# Patient Record
Sex: FEMALE | Race: WHITE | Employment: FULL TIME | URBAN - METROPOLITAN AREA
[De-identification: names, ages, dates, MRNs, and addresses within clinical notes are randomized per-mention and may not be internally consistent; named-entity substitution may affect disease eponyms.]

---

## 2017-04-06 ENCOUNTER — EMPLOYEE WELLNESS (OUTPATIENT)
Dept: OTHER | Age: 57
End: 2017-04-06

## 2017-04-06 LAB
CHOLESTEROL, TOTAL: 204 MG/DL (ref 0–199)
GLUCOSE BLD-MCNC: 94 MG/DL (ref 70–99)
HDLC SERPL-MCNC: 72 MG/DL (ref 40–60)
LDL CHOLESTEROL CALCULATED: 119 MG/DL
TRIGL SERPL-MCNC: 67 MG/DL (ref 0–150)

## 2017-04-27 ENCOUNTER — OFFICE VISIT (OUTPATIENT)
Dept: INTERNAL MEDICINE CLINIC | Age: 57
End: 2017-04-27

## 2017-04-27 VITALS
HEIGHT: 64 IN | HEART RATE: 80 BPM | DIASTOLIC BLOOD PRESSURE: 80 MMHG | BODY MASS INDEX: 27.83 KG/M2 | SYSTOLIC BLOOD PRESSURE: 134 MMHG | TEMPERATURE: 98.3 F | WEIGHT: 163 LBS

## 2017-04-27 DIAGNOSIS — J30.1 SEASONAL ALLERGIC RHINITIS DUE TO POLLEN: ICD-10-CM

## 2017-04-27 DIAGNOSIS — Z00.00 ANNUAL PHYSICAL EXAM: Primary | ICD-10-CM

## 2017-04-27 DIAGNOSIS — Z12.11 COLON CANCER SCREENING: ICD-10-CM

## 2017-04-27 PROCEDURE — 99396 PREV VISIT EST AGE 40-64: CPT | Performed by: INTERNAL MEDICINE

## 2017-04-27 ASSESSMENT — ENCOUNTER SYMPTOMS
EYES NEGATIVE: 1
RHINORRHEA: 1
GASTROINTESTINAL NEGATIVE: 1
COLOR CHANGE: 0
STRIDOR: 0
APNEA: 0
CHOKING: 0
SINUS PRESSURE: 0

## 2017-04-27 ASSESSMENT — PATIENT HEALTH QUESTIONNAIRE - PHQ9
SUM OF ALL RESPONSES TO PHQ9 QUESTIONS 1 & 2: 0
SUM OF ALL RESPONSES TO PHQ QUESTIONS 1-9: 0
1. LITTLE INTEREST OR PLEASURE IN DOING THINGS: 0
2. FEELING DOWN, DEPRESSED OR HOPELESS: 0

## 2017-06-28 ENCOUNTER — SURG/PROC ORDERS (OUTPATIENT)
Dept: ANESTHESIOLOGY | Age: 57
End: 2017-06-28

## 2017-06-28 RX ORDER — SODIUM CHLORIDE 0.9 % (FLUSH) 0.9 %
10 SYRINGE (ML) INJECTION EVERY 12 HOURS SCHEDULED
Status: CANCELLED | OUTPATIENT
Start: 2017-06-28

## 2017-06-28 RX ORDER — SODIUM CHLORIDE 9 MG/ML
INJECTION, SOLUTION INTRAVENOUS CONTINUOUS
Status: CANCELLED | OUTPATIENT
Start: 2017-06-28

## 2017-06-28 RX ORDER — SODIUM CHLORIDE 0.9 % (FLUSH) 0.9 %
10 SYRINGE (ML) INJECTION PRN
Status: CANCELLED | OUTPATIENT
Start: 2017-06-28

## 2017-06-29 ENCOUNTER — HOSPITAL ENCOUNTER (OUTPATIENT)
Dept: ENDOSCOPY | Age: 57
Discharge: HOME | End: 2017-06-29
Attending: INTERNAL MEDICINE | Admitting: INTERNAL MEDICINE

## 2017-06-29 VITALS
BODY MASS INDEX: 27.11 KG/M2 | OXYGEN SATURATION: 100 % | HEART RATE: 75 BPM | SYSTOLIC BLOOD PRESSURE: 132 MMHG | RESPIRATION RATE: 16 BRPM | TEMPERATURE: 97.3 F | DIASTOLIC BLOOD PRESSURE: 77 MMHG | WEIGHT: 162.7 LBS | HEIGHT: 65 IN

## 2017-06-29 LAB — PREGNANCY, URINE: NEGATIVE

## 2017-06-29 RX ORDER — SODIUM CHLORIDE 0.9 % (FLUSH) 0.9 %
10 SYRINGE (ML) INJECTION PRN
Status: DISCONTINUED | OUTPATIENT
Start: 2017-06-29 | End: 2017-06-30 | Stop reason: HOSPADM

## 2017-06-29 RX ORDER — SODIUM CHLORIDE 9 MG/ML
INJECTION, SOLUTION INTRAVENOUS CONTINUOUS
Status: DISCONTINUED | OUTPATIENT
Start: 2017-06-29 | End: 2017-06-30 | Stop reason: HOSPADM

## 2017-06-29 RX ORDER — ONDANSETRON 2 MG/ML
4 INJECTION INTRAMUSCULAR; INTRAVENOUS
Status: ACTIVE | OUTPATIENT
Start: 2017-06-29 | End: 2017-06-29

## 2017-06-29 RX ORDER — SODIUM CHLORIDE 0.9 % (FLUSH) 0.9 %
10 SYRINGE (ML) INJECTION EVERY 12 HOURS SCHEDULED
Status: DISCONTINUED | OUTPATIENT
Start: 2017-06-29 | End: 2017-06-30 | Stop reason: HOSPADM

## 2017-06-29 RX ADMIN — SODIUM CHLORIDE: 9 INJECTION, SOLUTION INTRAVENOUS at 11:08

## 2017-06-29 ASSESSMENT — PAIN SCALES - GENERAL
PAINLEVEL_OUTOF10: 0
PAINLEVEL_OUTOF10: 0

## 2017-06-29 ASSESSMENT — ENCOUNTER SYMPTOMS: SHORTNESS OF BREATH: 0

## 2017-06-29 ASSESSMENT — PAIN - FUNCTIONAL ASSESSMENT: PAIN_FUNCTIONAL_ASSESSMENT: 0-10

## 2018-03-20 VITALS — BODY MASS INDEX: 28.05 KG/M2 | WEIGHT: 166 LBS

## 2018-03-29 ENCOUNTER — OFFICE VISIT (OUTPATIENT)
Dept: INTERNAL MEDICINE CLINIC | Age: 58
End: 2018-03-29

## 2018-03-29 VITALS
SYSTOLIC BLOOD PRESSURE: 132 MMHG | TEMPERATURE: 98.3 F | OXYGEN SATURATION: 98 % | BODY MASS INDEX: 27.46 KG/M2 | WEIGHT: 165 LBS | DIASTOLIC BLOOD PRESSURE: 86 MMHG | HEART RATE: 96 BPM

## 2018-03-29 DIAGNOSIS — J30.1 CHRONIC SEASONAL ALLERGIC RHINITIS DUE TO POLLEN: ICD-10-CM
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
## 2018-03-29 PROCEDURE — 99214 OFFICE O/P EST MOD 30 MIN: CPT | Performed by: INTERNAL MEDICINE

## 2018-03-29 RX ORDER — METHYLPREDNISOLONE 4 MG/1
TABLET ORAL
Qty: 1 KIT | Refills: 0 | Status: SHIPPED | OUTPATIENT
Start: 2018-03-29 | End: 2018-04-04

## 2018-03-29 RX ORDER — BETAMETHASONE DIPROPIONATE 0.5 MG/G
CREAM TOPICAL
Qty: 15 G | Refills: 1 | Status: SHIPPED | OUTPATIENT
Start: 2018-03-29 | End: 2018-04-28

## 2018-03-29 RX ORDER — DOXYCYCLINE HYCLATE 100 MG
100 TABLET ORAL 2 TIMES DAILY
Qty: 14 TABLET | Refills: 0 | Status: SHIPPED | OUTPATIENT
Start: 2018-03-29 | End: 2018-04-05

## 2018-03-29 ASSESSMENT — ENCOUNTER SYMPTOMS: COLOR CHANGE: 1

## 2018-03-29 NOTE — PATIENT INSTRUCTIONS
Please call your pharmacy if you need any refills of your medication(s). Please call our office at [phone] if you don't hear from us about your test results. Bring an accurate list of your medications with you at every appointment to ensure that we have the correct information.     Our office hours are: Monday - Friday 8:30 am- 5 pm    Phone lines turn on at 8:30 am

## 2018-05-14 ENCOUNTER — EMPLOYEE WELLNESS (OUTPATIENT)
Dept: OTHER | Age: 58
End: 2018-05-14

## 2018-05-14 LAB
CHOLESTEROL, TOTAL: 203 MG/DL (ref 0–199)
GLUCOSE BLD-MCNC: 109 MG/DL (ref 70–99)
HDLC SERPL-MCNC: 76 MG/DL (ref 40–60)
LDL CHOLESTEROL CALCULATED: 102 MG/DL
TRIGL SERPL-MCNC: 123 MG/DL (ref 0–150)

## 2018-05-21 VITALS — BODY MASS INDEX: 26.96 KG/M2 | WEIGHT: 162 LBS

## 2018-07-05 ENCOUNTER — OFFICE VISIT (OUTPATIENT)
Dept: INTERNAL MEDICINE CLINIC | Age: 58
End: 2018-07-05

## 2018-07-05 ENCOUNTER — TELEPHONE (OUTPATIENT)
Dept: INTERNAL MEDICINE CLINIC | Age: 58
End: 2018-07-05

## 2018-07-05 VITALS
WEIGHT: 161.38 LBS | HEART RATE: 80 BPM | SYSTOLIC BLOOD PRESSURE: 138 MMHG | TEMPERATURE: 98.6 F | DIASTOLIC BLOOD PRESSURE: 80 MMHG | OXYGEN SATURATION: 98 % | BODY MASS INDEX: 26.85 KG/M2

## 2018-07-05 DIAGNOSIS — H60.339 ACUTE SWIMMER'S EAR, UNSPECIFIED LATERALITY: Primary | ICD-10-CM

## 2018-07-05 PROCEDURE — 99213 OFFICE O/P EST LOW 20 MIN: CPT | Performed by: INTERNAL MEDICINE

## 2018-07-05 ASSESSMENT — ENCOUNTER SYMPTOMS
RHINORRHEA: 1
GASTROINTESTINAL NEGATIVE: 1

## 2018-07-05 ASSESSMENT — PATIENT HEALTH QUESTIONNAIRE - PHQ9
1. LITTLE INTEREST OR PLEASURE IN DOING THINGS: 0
2. FEELING DOWN, DEPRESSED OR HOPELESS: 0
SUM OF ALL RESPONSES TO PHQ QUESTIONS 1-9: 0
SUM OF ALL RESPONSES TO PHQ9 QUESTIONS 1 & 2: 0

## 2018-07-05 NOTE — PATIENT INSTRUCTIONS
Please call your pharmacy if you need any refills of your medication(s). Please call our office at [phone] if you don't hear from us about your test results. Bring an accurate list of your medications with you at every appointment to ensure that we have the correct information.     Our office hours are: Monday - Friday 8:30 am- 5 pm    Phone lines turn on at 8:30 am

## 2018-07-05 NOTE — PROGRESS NOTES
Subjective:      Patient ID: Andrei Singh is a 56 y.o. female. Patient presents with:  Otalgia: right ear pain off and on, swimming a lot, wasn't sure if tmj or ear inf. hurt when she put her hearing aid in. Andrei Singh is a 56 y.o. female with the [facility]: There are no active problems to display for this patient. Current Outpatient Prescriptions:  ciprofloxacin-hydrocortisone (CIPRO HC) 0.2-1 % otic suspension, Place 4 drops in ear(s) 2 times daily for 7 days, Disp: 10 mL, Rfl: 0  fluticasone (FLONASE) 50 MCG/ACT nasal spray, 1 spray by Nasal route as needed for Rhinitis, Disp: , Rfl:   MULTIPLE VITAMIN PO, Take  by mouth daily. , Disp: , Rfl:   guaiFENesin (MUCINEX) 600 MG SR tablet, Take 1,200 mg by mouth daily as needed. , Disp: , Rfl:     No current facility-administered medications for this visit. Allergies: Patient has no known allergies. Past Medical History:  No date: Allergic rhinitis  No date: Hearing loss in       Comment: since birth, wears hearing aids  No date: Hearing loss in right ear      Comment: wears hearing aids  No date: Wears hearing aid      Comment: bilateral  Past Surgical History:  2017: COLONOSCOPY  No date: EXTERNAL EAR SURGERY Left      Comment: at birth  No date: TONSILLECTOMY  Review of patient's family history indicates:  Problem: Heart Disease      Relation: Father       Age of Onset: (Not Specified)   Problem: Cancer      Relation: Father       Age of Onset: (Not Specified)       Comment: prostate cancer. Smoking status: Former Smoker                                                              Packs/day: 0.50      Years: 30.00        Quit date: 2012  Smokeless tobacco: Never Used                      Alcohol use: Yes           2.4 oz/week     Cans of beer: 4 per week     Comment: couple times per week        Otalgia    There is pain in the right ear. This is a new problem.  The current episode started in the past 7 visit:    Acute swimmer's ear, unspecified laterality  -     ciprofloxacin-hydrocortisone (CIPRO HC) 0.2-1 % otic suspension; Place 4 drops in ear(s) 2 times daily for 7 days

## 2018-11-26 ENCOUNTER — HOSPITAL ENCOUNTER (OUTPATIENT)
Dept: WOMENS IMAGING | Age: 58
Discharge: HOME | End: 2018-11-26
Payer: COMMERCIAL

## 2018-11-26 DIAGNOSIS — Z12.31 VISIT FOR SCREENING MAMMOGRAM: ICD-10-CM

## 2018-11-26 PROCEDURE — 77067 SCR MAMMO BI INCL CAD: CPT

## 2019-01-01 ENCOUNTER — APPOINTMENT (OUTPATIENT)
Dept: CT IMAGING | Age: 59
End: 2019-01-01
Payer: COMMERCIAL

## 2019-01-01 ENCOUNTER — HOSPITAL ENCOUNTER (EMERGENCY)
Age: 59
Discharge: HOME | End: 2019-01-01
Attending: EMERGENCY MEDICINE
Payer: COMMERCIAL

## 2019-01-01 VITALS
BODY MASS INDEX: 26.85 KG/M2 | HEIGHT: 65 IN | OXYGEN SATURATION: 100 % | HEART RATE: 86 BPM | DIASTOLIC BLOOD PRESSURE: 80 MMHG | RESPIRATION RATE: 24 BRPM | TEMPERATURE: 97 F | SYSTOLIC BLOOD PRESSURE: 167 MMHG

## 2019-01-01 DIAGNOSIS — R42 DIZZINESS: ICD-10-CM

## 2019-01-01 DIAGNOSIS — H81.11 BENIGN PAROXYSMAL POSITIONAL VERTIGO OF RIGHT EAR: Primary | ICD-10-CM

## 2019-01-01 LAB
A/G RATIO: 1.5 (ref 1.1–2.2)
ALBUMIN SERPL-MCNC: 4.9 G/DL (ref 3.4–5)
ALP BLD-CCNC: 77 U/L (ref 40–129)
ALT SERPL-CCNC: 20 U/L (ref 10–40)
ANION GAP SERPL CALCULATED.3IONS-SCNC: 18 MMOL/L (ref 3–16)
AST SERPL-CCNC: 18 U/L (ref 15–37)
BASOPHILS ABSOLUTE: 0 K/UL (ref 0–0.2)
BASOPHILS RELATIVE PERCENT: 0.6 %
BILIRUB SERPL-MCNC: 0.7 MG/DL (ref 0–1)
BUN BLDV-MCNC: 24 MG/DL (ref 7–20)
CALCIUM SERPL-MCNC: 9.4 MG/DL (ref 8.3–10.6)
CHLORIDE BLD-SCNC: 101 MMOL/L (ref 99–110)
CO2: 19 MMOL/L (ref 21–32)
CREAT SERPL-MCNC: 0.9 MG/DL (ref 0.6–1.1)
EOSINOPHILS ABSOLUTE: 0.2 K/UL (ref 0–0.6)
EOSINOPHILS RELATIVE PERCENT: 2 %
GFR AFRICAN AMERICAN: >60
GFR NON-AFRICAN AMERICAN: >60
GLOBULIN: 3.2 G/DL
GLUCOSE BLD-MCNC: 119 MG/DL (ref 70–99)
GLUCOSE BLD-MCNC: 120 MG/DL (ref 70–99)
HCT VFR BLD CALC: 52.8 % (ref 36–48)
HEMOGLOBIN: 17.5 G/DL (ref 12–16)
LYMPHOCYTES ABSOLUTE: 2.7 K/UL (ref 1–5.1)
LYMPHOCYTES RELATIVE PERCENT: 34.3 %
MCH RBC QN AUTO: 31.5 PG (ref 26–34)
MCHC RBC AUTO-ENTMCNC: 33.1 G/DL (ref 31–36)
MCV RBC AUTO: 95.1 FL (ref 80–100)
MONOCYTES ABSOLUTE: 0.5 K/UL (ref 0–1.3)
MONOCYTES RELATIVE PERCENT: 7 %
NEUTROPHILS ABSOLUTE: 4.4 K/UL (ref 1.7–7.7)
NEUTROPHILS RELATIVE PERCENT: 56.1 %
PDW BLD-RTO: 12.3 % (ref 12.4–15.4)
PERFORMED ON: ABNORMAL
PLATELET # BLD: 221 K/UL (ref 135–450)
PMV BLD AUTO: 7.8 FL (ref 5–10.5)
POTASSIUM REFLEX MAGNESIUM: 3.9 MMOL/L (ref 3.5–5.1)
RBC # BLD: 5.55 M/UL (ref 4–5.2)
SODIUM BLD-SCNC: 138 MMOL/L (ref 136–145)
TOTAL PROTEIN: 8.1 G/DL (ref 6.4–8.2)
TROPONIN: <0.01 NG/ML
WBC # BLD: 7.8 K/UL (ref 4–11)

## 2019-01-01 PROCEDURE — 99284 EMERGENCY DEPT VISIT MOD MDM: CPT

## 2019-01-01 PROCEDURE — 6360000004 HC RX CONTRAST MEDICATION: Performed by: EMERGENCY MEDICINE

## 2019-01-01 PROCEDURE — 70498 CT ANGIOGRAPHY NECK: CPT

## 2019-01-01 PROCEDURE — 2580000003 HC RX 258: Performed by: EMERGENCY MEDICINE

## 2019-01-01 PROCEDURE — 36415 COLL VENOUS BLD VENIPUNCTURE: CPT

## 2019-01-01 PROCEDURE — 96361 HYDRATE IV INFUSION ADD-ON: CPT

## 2019-01-01 PROCEDURE — 80053 COMPREHEN METABOLIC PANEL: CPT

## 2019-01-01 PROCEDURE — 70496 CT ANGIOGRAPHY HEAD: CPT

## 2019-01-01 PROCEDURE — 84484 ASSAY OF TROPONIN QUANT: CPT

## 2019-01-01 PROCEDURE — 85025 COMPLETE CBC W/AUTO DIFF WBC: CPT

## 2019-01-01 PROCEDURE — 96360 HYDRATION IV INFUSION INIT: CPT

## 2019-01-01 PROCEDURE — 70450 CT HEAD/BRAIN W/O DYE: CPT

## 2019-01-01 PROCEDURE — 93005 ELECTROCARDIOGRAM TRACING: CPT | Performed by: EMERGENCY MEDICINE

## 2019-01-01 RX ORDER — AMOXICILLIN AND CLAVULANATE POTASSIUM 875; 125 MG/1; MG/1
1 TABLET, FILM COATED ORAL 2 TIMES DAILY
Qty: 20 TABLET | Refills: 0 | Status: SHIPPED | OUTPATIENT
Start: 2019-01-01 | End: 2019-01-11

## 2019-01-01 RX ORDER — MECLIZINE HYDROCHLORIDE 25 MG/1
25 TABLET ORAL 3 TIMES DAILY PRN
Qty: 30 TABLET | Refills: 0 | Status: SHIPPED | OUTPATIENT
Start: 2019-01-01 | End: 2019-01-11

## 2019-01-01 RX ORDER — SODIUM CHLORIDE, SODIUM LACTATE, POTASSIUM CHLORIDE, AND CALCIUM CHLORIDE .6; .31; .03; .02 G/100ML; G/100ML; G/100ML; G/100ML
2000 INJECTION, SOLUTION INTRAVENOUS ONCE
Status: COMPLETED | OUTPATIENT
Start: 2019-01-01 | End: 2019-01-01

## 2019-01-01 RX ADMIN — SODIUM CHLORIDE, POTASSIUM CHLORIDE, SODIUM LACTATE AND CALCIUM CHLORIDE 2000 ML: 600; 310; 30; 20 INJECTION, SOLUTION INTRAVENOUS at 20:14

## 2019-01-01 RX ADMIN — IOPAMIDOL 75 ML: 755 INJECTION, SOLUTION INTRAVENOUS at 19:35

## 2019-01-01 ASSESSMENT — PAIN SCALES - GENERAL: PAINLEVEL_OUTOF10: 0

## 2019-01-02 LAB
EKG ATRIAL RATE: 90 BPM
EKG DIAGNOSIS: NORMAL
EKG P AXIS: 43 DEGREES
EKG P-R INTERVAL: 140 MS
EKG Q-T INTERVAL: 372 MS
EKG QRS DURATION: 68 MS
EKG QTC CALCULATION (BAZETT): 455 MS
EKG R AXIS: 25 DEGREES
EKG T AXIS: 17 DEGREES
EKG VENTRICULAR RATE: 90 BPM

## 2019-01-02 PROCEDURE — 93010 ELECTROCARDIOGRAM REPORT: CPT | Performed by: INTERNAL MEDICINE

## 2019-05-06 ENCOUNTER — EMPLOYEE WELLNESS (OUTPATIENT)
Dept: OTHER | Age: 59
End: 2019-05-06

## 2019-05-06 LAB
CHOLESTEROL, TOTAL: 202 MG/DL (ref 0–199)
GLUCOSE BLD-MCNC: 94 MG/DL (ref 70–99)
HDLC SERPL-MCNC: 69 MG/DL (ref 40–60)
LDL CHOLESTEROL CALCULATED: 108 MG/DL
TRIGL SERPL-MCNC: 126 MG/DL (ref 0–150)

## 2019-05-13 VITALS — BODY MASS INDEX: 26.13 KG/M2 | WEIGHT: 157 LBS

## 2020-08-13 ENCOUNTER — OFFICE VISIT (OUTPATIENT)
Dept: FAMILY MEDICINE CLINIC | Age: 60
End: 2020-08-13
Payer: COMMERCIAL

## 2020-08-13 VITALS
OXYGEN SATURATION: 98 % | BODY MASS INDEX: 27.79 KG/M2 | DIASTOLIC BLOOD PRESSURE: 88 MMHG | HEART RATE: 87 BPM | WEIGHT: 167 LBS | TEMPERATURE: 97.9 F | SYSTOLIC BLOOD PRESSURE: 138 MMHG

## 2020-08-13 PROCEDURE — 99214 OFFICE O/P EST MOD 30 MIN: CPT | Performed by: INTERNAL MEDICINE

## 2020-08-13 ASSESSMENT — ENCOUNTER SYMPTOMS
STRIDOR: 0
EYES NEGATIVE: 1
CHOKING: 0
BACK PAIN: 1
COLOR CHANGE: 0
APNEA: 0
SINUS PRESSURE: 0

## 2020-08-13 NOTE — PROGRESS NOTES
Subjective:      Patient ID: Zulema Merino is a 64 y.o. female. Patient presents with:  Back Pain: mid left side of  back, with a deep breath, like a pulled muscle. Joint Swelling: right ankle painful and swollen. twisted it last month () at a friend's house. slipped in water and twisted it    Zulema Merino is a 64 y.o. female with the following history as recorded in [facility]: There are no active problems to display for this patient. Current Outpatient Medications:  Cyanocobalamin (B-12 PO), Take by mouth, Disp: , Rfl:   fluticasone (FLONASE) 50 MCG/ACT nasal spray, 1 spray by Nasal route as needed for Rhinitis, Disp: , Rfl:   MULTIPLE VITAMIN PO, Take  by mouth daily. , Disp: , Rfl:   guaiFENesin (MUCINEX) 600 MG SR tablet, Take 1,200 mg by mouth daily as needed. , Disp: , Rfl:     No current facility-administered medications for this visit. Allergies: Patient has no known allergies. Past Medical History:  No date: Allergic rhinitis  No date: Hearing loss in       Comment:  since birth, wears hearing aids  No date: Hearing loss in right ear      Comment:  wears hearing aids  No date: Wears hearing aid      Comment:  bilateral  Past Surgical History:  2017: COLONOSCOPY  No date: EXTERNAL EAR SURGERY; Left      Comment:  at birth  No date: TONSILLECTOMY  Review of patient's family history indicates:  Problem: Heart Disease      Relation: Father          Age of Onset: (Not Specified)  Problem: Cancer      Relation: Father          Age of Onset: (Not Specified)          Comment: prostate cancer. Social History    Tobacco Use      Smoking status: Current Some Day Smoker        Packs/day: 0.50        Years: 30.00        Pack years: 13        Quit date: 2012        Years since quittin.5      Smokeless tobacco: Never Used    Alcohol use:  Yes      Alcohol/week: 4.0 standard drinks      Types: 4 Cans of beer per week      Comment: couple times per week      Back Pain   Associated symptoms include chest pain. Ankle Pain      Chest Pain    Associated symptoms include back pain. Pertinent negatives include no dizziness. Pertinent negatives for past medical history include no seizures. Review of Systems   Constitutional: Negative. HENT: Negative for ear pain, hearing loss and sinus pressure. Eyes: Negative. Respiratory: Negative for apnea, choking and stridor. Cardiovascular: Positive for chest pain. Genitourinary: Negative for hematuria. Musculoskeletal: Positive for back pain. Rt ankle pain and swelling. Skin: Negative for color change and pallor. Neurological: Negative for dizziness, tremors, seizures and syncope. Hematological: Does not bruise/bleed easily. Psychiatric/Behavioral: Negative for confusion, decreased concentration and dysphoric mood. Objective:   Physical Exam  Constitutional:       General: She is not in acute distress. Appearance: She is well-developed. She is not diaphoretic. HENT:      Head: Normocephalic and atraumatic. Right Ear: External ear normal.      Left Ear: External ear normal.      Nose: Nose normal.      Mouth/Throat:      Pharynx: No oropharyngeal exudate. Eyes:      General: No scleral icterus. Right eye: No discharge. Left eye: No discharge. Conjunctiva/sclera: Conjunctivae normal.      Pupils: Pupils are equal, round, and reactive to light. Neck:      Musculoskeletal: Normal range of motion and neck supple. Thyroid: No thyromegaly. Vascular: No JVD. Trachea: No tracheal deviation. Cardiovascular:      Rate and Rhythm: Normal rate and regular rhythm. Heart sounds: Normal heart sounds. No murmur. No friction rub. No gallop. Pulmonary:      Effort: Pulmonary effort is normal. No respiratory distress. Breath sounds: Normal breath sounds. No stridor. No wheezing or rales. Chest:      Chest wall: No tenderness.    Abdominal:      General: There is no distension. Palpations: Abdomen is soft. There is no mass. Tenderness: There is no abdominal tenderness. There is no guarding or rebound. Genitourinary:     Vagina: Normal. No vaginal discharge. Rectum: Guaiac result negative. Musculoskeletal: Normal range of motion. General: No tenderness. Back:         Feet:    Lymphadenopathy:      Cervical: No cervical adenopathy. Skin:     General: Skin is warm and dry. Coloration: Skin is not pale. Findings: No erythema or rash. Neurological:      Mental Status: She is alert and oriented to person, place, and time. Cranial Nerves: No cranial nerve deficit. Motor: No abnormal muscle tone. Coordination: Coordination normal.      Deep Tendon Reflexes: Reflexes are normal and symmetric. Reflexes normal.   Psychiatric:         Behavior: Behavior normal.         Thought Content: Thought content normal.         Judgment: Judgment normal.         Assessment:      Encounter Diagnoses   Name Primary?  Sprain of posterior talofibular ligament of right ankle, initial encounter Yes    Acute left-sided low back pain without sciatica     Tobacco abuse            Plan:      Jennie Goetz was seen today for back pain and joint swelling. Diagnoses and all orders for this visit:    Sprain of posterior talofibular ligament of right ankle, initial encounter    Acute left-sided low back pain without sciatica    Tobacco abuse      May need MRI ankle if pain is getting worse.         Luis Randle MD

## 2021-03-01 ENCOUNTER — OFFICE VISIT (OUTPATIENT)
Dept: FAMILY MEDICINE CLINIC | Age: 61
End: 2021-03-01
Payer: COMMERCIAL

## 2021-03-01 VITALS
TEMPERATURE: 97.7 F | SYSTOLIC BLOOD PRESSURE: 144 MMHG | BODY MASS INDEX: 27.29 KG/M2 | OXYGEN SATURATION: 98 % | WEIGHT: 164 LBS | DIASTOLIC BLOOD PRESSURE: 92 MMHG | HEART RATE: 108 BPM

## 2021-03-01 DIAGNOSIS — I10 HYPERTENSION, UNSPECIFIED TYPE: Primary | ICD-10-CM

## 2021-03-01 DIAGNOSIS — Z72.0 TOBACCO ABUSE: ICD-10-CM

## 2021-03-01 DIAGNOSIS — H60.501 ACUTE OTITIS EXTERNA OF RIGHT EAR, UNSPECIFIED TYPE: ICD-10-CM

## 2021-03-01 PROCEDURE — 99213 OFFICE O/P EST LOW 20 MIN: CPT | Performed by: INTERNAL MEDICINE

## 2021-03-01 RX ORDER — DOXYCYCLINE HYCLATE 100 MG/1
100 CAPSULE ORAL 2 TIMES DAILY
Qty: 14 CAPSULE | Refills: 0 | Status: SHIPPED | OUTPATIENT
Start: 2021-03-01 | End: 2021-03-08

## 2021-03-01 RX ORDER — METOPROLOL SUCCINATE 25 MG/1
25 TABLET, EXTENDED RELEASE ORAL DAILY
Qty: 30 TABLET | Refills: 3 | Status: SHIPPED | OUTPATIENT
Start: 2021-03-01 | End: 2021-03-31 | Stop reason: SDUPTHER

## 2021-03-01 ASSESSMENT — ENCOUNTER SYMPTOMS
EYES NEGATIVE: 1
APNEA: 0
COLOR CHANGE: 0
CHOKING: 0
STRIDOR: 0
RHINORRHEA: 0
SINUS PRESSURE: 0
BACK PAIN: 0
GASTROINTESTINAL NEGATIVE: 1
SHORTNESS OF BREATH: 1

## 2021-03-01 NOTE — PROGRESS NOTES
Subjective:      Patient ID: Xochilt Christy is a 64 y.o. female. Patient presents with:  Otalgia: ears feel \"full\", carly right side. trouble hearing, vertigo. also GYN told her that her BP is very high. Xochilt Christy is a 64 y.o. female with the following history as recorded in [facility]: There are no active problems to display for this patient. Current Outpatient Medications:    MILK THISTLE PO, Take by mouth, Disp: , Rfl:     ZINC PO, Take by mouth, Disp: , Rfl:     metoprolol succinate (TOPROL XL) 25 MG extended release tablet, Take 1 tablet by mouth daily, Disp: 30 tablet, Rfl: 3    doxycycline hyclate (VIBRAMYCIN) 100 MG capsule, Take 1 capsule by mouth 2 times daily for 7 days, Disp: 14 capsule, Rfl: 0    fluticasone (FLONASE) 50 MCG/ACT nasal spray, 1 spray by Nasal route as needed for Rhinitis, Disp: , Rfl:     MULTIPLE VITAMIN PO, Take  by mouth daily. , Disp: , Rfl:     guaiFENesin (MUCINEX) 600 MG SR tablet, Take 1,200 mg by mouth daily as needed. , Disp: , Rfl:     No current facility-administered medications for this visit. Allergies: Patient has no known allergies. Past Medical History:  No date: Allergic rhinitis  No date: Hearing loss in       Comment:  since birth, wears hearing aids  No date: Hearing loss in right ear      Comment:  wears hearing aids  No date: Wears hearing aid      Comment:  bilateral  Past Surgical History:  2017: COLONOSCOPY  No date: EXTERNAL EAR SURGERY; Left      Comment:  at birth  No date: TONSILLECTOMY  Review of patient's family history indicates:  Problem: Heart Disease      Relation: Father          Age of Onset: (Not Specified)  Problem: Cancer      Relation: Father          Age of Onset: (Not Specified)          Comment: prostate cancer.     Social History    Tobacco Use      Smoking status: Former Smoker        Packs/day: 0.50        Years: 30.00        Pack years: 13        Quit date: 2020        Years since quittin.5 Smokeless tobacco: Never Used    Alcohol use: Yes      Alcohol/week: 4.0 standard drinks      Types: 4 Cans of beer per week      Comment: couple times per week      Hypertension  This is a new problem. The current episode started 1 to 4 weeks ago. The problem is unchanged. The problem is uncontrolled. Associated symptoms include shortness of breath. There are no associated agents to hypertension. There are no known risk factors for coronary artery disease. Past treatments include nothing. The current treatment provides no improvement. There are no compliance problems. There is no history of chronic renal disease. Otalgia   There is pain in the right ear. This is a new problem. The current episode started yesterday. The problem has been unchanged. The pain is at a severity of 3/10. The pain is mild. Associated symptoms include hearing loss. Pertinent negatives include no rhinorrhea. She has tried nothing for the symptoms. The treatment provided no relief. Her past medical history is significant for hearing loss. Review of Systems   Constitutional: Negative. Negative for fatigue. HENT: Positive for ear pain and hearing loss. Negative for rhinorrhea, sinus pressure and tinnitus. Eyes: Negative. Respiratory: Positive for shortness of breath. Negative for apnea, choking and stridor. Gastrointestinal: Negative. Genitourinary: Negative for hematuria. Musculoskeletal: Negative for back pain. Skin: Negative for color change and pallor. Neurological: Negative for dizziness, tremors, seizures and syncope. Hematological: Does not bruise/bleed easily. Psychiatric/Behavioral: Negative for confusion, decreased concentration and dysphoric mood. Objective:   Physical Exam  Constitutional:       General: She is not in acute distress. Appearance: She is well-developed. She is not diaphoretic. HENT:      Head: Normocephalic and atraumatic. Right Ear: External ear normal. Decreased hearing noted. Tenderness (rt ear canal is red and tender.) present. Tympanic membrane is scarred. Tympanic membrane is not perforated. Left Ear: External ear normal. Decreased hearing noted. Tympanic membrane is scarred. Tympanic membrane is not perforated. Nose: Nose normal.      Mouth/Throat:      Pharynx: No oropharyngeal exudate. Eyes:      General: No scleral icterus. Right eye: No discharge. Left eye: No discharge. Conjunctiva/sclera: Conjunctivae normal.      Pupils: Pupils are equal, round, and reactive to light. Neck:      Musculoskeletal: Normal range of motion and neck supple. Thyroid: No thyromegaly. Vascular: No JVD. Trachea: No tracheal deviation. Cardiovascular:      Rate and Rhythm: Normal rate and regular rhythm. Heart sounds: Normal heart sounds. No murmur. No friction rub. No gallop. Pulmonary:      Effort: Pulmonary effort is normal. No respiratory distress. Breath sounds: Normal breath sounds. No stridor. No wheezing or rales. Chest:      Chest wall: No tenderness. Abdominal:      General: There is no distension. Palpations: Abdomen is soft. There is no mass. Tenderness: There is no abdominal tenderness. There is no guarding or rebound. Genitourinary:     Vagina: Normal. No vaginal discharge. Rectum: Guaiac result negative. Musculoskeletal: Normal range of motion. General: No tenderness. Lymphadenopathy:      Cervical: No cervical adenopathy. Skin:     General: Skin is warm and dry. Coloration: Skin is not pale. Findings: No erythema or rash. Neurological:      Mental Status: She is alert and oriented to person, place, and time. Cranial Nerves: No cranial nerve deficit. Motor: No abnormal muscle tone. Coordination: Coordination normal.      Deep Tendon Reflexes: Reflexes are normal and symmetric.  Reflexes normal. Psychiatric:         Behavior: Behavior normal.         Thought Content: Thought content normal.         Judgment: Judgment normal.         Assessment:      Encounter Diagnoses   Name Primary?  Hypertension, unspecified type Yes    Acute otitis externa of right ear, unspecified type     Tobacco abuse            Plan:      Jennifer Sagastume was seen today for otalgia. Diagnoses and all orders for this visit:    Hypertension, unspecified type  -     metoprolol succinate (TOPROL XL) 25 MG extended release tablet; Take 1 tablet by mouth daily    Acute otitis externa of right ear, unspecified type  -     doxycycline hyclate (VIBRAMYCIN) 100 MG capsule; Take 1 capsule by mouth 2 times daily for 7 days    Tobacco abuse      Advised her to check blood pressure at home.         Elina Espana MD

## 2021-03-01 NOTE — PATIENT INSTRUCTIONS
Please call your pharmacy if you need any refills of your medication(s). Please call our office at [phone] if you don't hear from us about your test results. Bring an accurate list of your medications with you at every appointment to ensure that we have the correct information.     Our office hours are: Monday - Friday 8:30 am- 5 pm    Phone lines turn on at 8:30 am

## 2021-03-02 ENCOUNTER — PROCEDURE VISIT (OUTPATIENT)
Dept: AUDIOLOGY | Age: 61
End: 2021-03-02
Payer: COMMERCIAL

## 2021-03-02 ENCOUNTER — OFFICE VISIT (OUTPATIENT)
Dept: ENT CLINIC | Age: 61
End: 2021-03-02
Payer: COMMERCIAL

## 2021-03-02 VITALS
HEART RATE: 93 BPM | WEIGHT: 163 LBS | BODY MASS INDEX: 27.12 KG/M2 | TEMPERATURE: 96.8 F | SYSTOLIC BLOOD PRESSURE: 152 MMHG | DIASTOLIC BLOOD PRESSURE: 94 MMHG

## 2021-03-02 DIAGNOSIS — H91.20 SUDDEN-ONSET SENSORINEURAL HEARING LOSS: ICD-10-CM

## 2021-03-02 DIAGNOSIS — H90.A21 SENSORINEURAL HEARING LOSS (SNHL) OF RIGHT EAR WITH RESTRICTED HEARING OF LEFT EAR: ICD-10-CM

## 2021-03-02 DIAGNOSIS — H93.13 TINNITUS OF BOTH EARS: ICD-10-CM

## 2021-03-02 DIAGNOSIS — H90.A31 MIXED CONDUCTIVE AND SENSORINEURAL HEARING LOSS OF RIGHT EAR WITH RESTRICTED HEARING OF LEFT EAR: ICD-10-CM

## 2021-03-02 DIAGNOSIS — R42 DIZZINESS: ICD-10-CM

## 2021-03-02 DIAGNOSIS — H90.A32 MIXED CONDUCTIVE AND SENSORINEURAL HEARING LOSS OF LEFT EAR WITH RESTRICTED HEARING OF RIGHT EAR: Primary | ICD-10-CM

## 2021-03-02 DIAGNOSIS — H91.90 HEARING LOSS, UNSPECIFIED HEARING LOSS TYPE, UNSPECIFIED LATERALITY: Primary | ICD-10-CM

## 2021-03-02 DIAGNOSIS — H93.8X1 EAR MASS, RIGHT: Primary | ICD-10-CM

## 2021-03-02 PROCEDURE — 92504 EAR MICROSCOPY EXAMINATION: CPT | Performed by: STUDENT IN AN ORGANIZED HEALTH CARE EDUCATION/TRAINING PROGRAM

## 2021-03-02 PROCEDURE — 92557 COMPREHENSIVE HEARING TEST: CPT | Performed by: AUDIOLOGIST

## 2021-03-02 PROCEDURE — 99204 OFFICE O/P NEW MOD 45 MIN: CPT | Performed by: STUDENT IN AN ORGANIZED HEALTH CARE EDUCATION/TRAINING PROGRAM

## 2021-03-02 PROCEDURE — 92567 TYMPANOMETRY: CPT | Performed by: AUDIOLOGIST

## 2021-03-02 RX ORDER — OMEPRAZOLE 40 MG/1
CAPSULE, DELAYED RELEASE ORAL
Qty: 30 CAPSULE | Refills: 1 | Status: SHIPPED | OUTPATIENT
Start: 2021-03-02 | End: 2021-06-25

## 2021-03-02 RX ORDER — PREDNISONE 10 MG/1
TABLET ORAL
Qty: 60 TABLET | Refills: 0 | Status: SHIPPED | OUTPATIENT
Start: 2021-03-02 | End: 2021-03-11 | Stop reason: SDUPTHER

## 2021-03-02 NOTE — Clinical Note
Dr. Kiarra Powell,    Please see note from this patient's audiogram from today. Please let me know if there is anything further you need.         Surinder Orona, Tg  Audiologist

## 2021-03-02 NOTE — PROGRESS NOTES
Sigifredo Agee   1960, 64 y.o. female   <[id number]>       Referring Provider: Navin Claudio MD  Referral Type: In an order in [address] [place] [address]    Reason for Visit: Evaluation of suspected change in hearing, tinnitus, or balance. ADULT AUDIOLOGIC EVALUATION      Sigifredo Agee is a 64 y.o. female seen today, 3/2/2021 , for an initial audiologic evaluation. Patient was seen by Navin Claudio MD following today's evaluation. AUDIOLOGIC AND OTHER PERTINENT MEDICAL HISTORY:      Sigifredo Agee noted tinnitus, dizziness, imbalance, history of ear surgery and family history of hearing loss. Patient reports a sudden decrease in hearing in the right ear. This began on 2/27/21. Patient has seen her dispensing audiologist to make sure her hearing aids are working appropriately. She also notes onset of unsteadiness on Saturday. She also brought her most recent audiogram with her from 2019. 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 She states about 25 years ago she had a ruptured ear drum and subsequent surgery AD. Patient states she mostly reads lips for communication. Patient is wearing [employer] hearing aids. Sigifredo Agee denied otalgia, aural fullness, otorrhea, history of falls, history of significant noise exposure and history of head trauma.     Date: 3/2/2021     IMPRESSIONS:      AD: Profound SNHL, No measurable word recognition, Type A tymp  AS: Moderate sloping to Profound SNHL with mixed component at 500 Hz, Very Poor WRS, Type Ad tymp with large volume Hearing loss consistent with ASNHL. Hearing loss significant enough to create hearing difficulty in most listening situations. Discussed hearing loss, tinnitus, dizziness, hearing aids and CI with patient. Patient may be a candidate for a cochlear implant. If desired, patient to schedule an appointment at [address] [place] [address] for CIE to determine if she is a candidate. Patient to return to her dispensing audiologist for further assistance with her current hearing aids. Patient to follow medical recommendations per Thornton Dancer, MD .    ASSESSMENT AND FINDINGS:     Otoscopy revealed: Clear ear canals bilaterally    RIGHT EAR:  Hearing Sensitivity: Profound sensorineural hearing loss. Speech Recognition Threshold: NR at equipment limit dB HL  Speech Detection Threshold: 95m dB HL  Word Recognition: Very Poor (0-59%), based on NU-6 25-word list at 105m dBHL (equipment limit) using recorded speech stimuli. Tympanometry: Normal peak pressure with high compliance, Type Ad tympanogram, consistent with hypermobile tympanic membrane. Acoustic Reflexes: Ipsilateral: Could not maintain seal. Contralateral: Could not maintain seal.    LEFT EAR:  Hearing Sensitivity: Moderate to Profound Sensorineural hearing loss with a mixed component at 500 Hz  Speech Recognition Threshold: 75 dB HL  Word Recognition: Very Poor (0-59%), based on NU-6 25-word list at 105m dBHL (equipment limit) using recorded speech stimuli. Tympanometry: Normal peak pressure and compliance, Type A tympanogram, consistent with normal middle ear function. Acoustic Reflexes: Ipsilateral: Could not maintain seal. Contralateral: Could not maintain seal.    Reliability: Good  Transducer: Inserts    See scanned audiogram dated 3/2/2021  for results.         PATIENT EDUCATION:       The following items were discussed with the patient:    - Good Communication Strategies    - Hearing Loss and Hearing Aids    - Fall Risk and Prevention   - Dizziness Educational information was shared in the After Visit Summary. RECOMMENDATIONS:                                                                                                                                                                                                                                                            The following items are recommended based on patient report and results from today's appointment:   - Continue medical follow-up with Akash Faith MD.   - Retest hearing as medically indicated and/or sooner if a change in hearing is noted. - Continue hearing aid use and follow-up with dispensing audiologist as needed    - Utilize \"Good Communication Strategies\" as discussed to assist in speech understanding with communication partners. - If medically indicated, consider vestibular evaluation to further investigate symptoms of dizziness.        Tg Keen  Audiologist    Chart CC'd to: Akash Faith MD      Degree of   Hearing Sensitivity dB Range   Within Normal Limits (WNL) 0 - 20   Mild 20 - 40   Moderate 40 - 55   Moderately-Severe 55 - 70   Severe 70 - 90   Profound 90 +

## 2021-03-02 NOTE — PROGRESS NOTES
[address] [facility] NECK SURGERY  CONSULT      Rigoberto Cui (:  1960) is a 64 y.o. female, here for evaluation of the following chief complaint(s):  Hearing Problem (complete loss in right ear )      ASSESSMENT/PLAN:  1. Ear mass, right  -     CT IAC POSTERIOR FOSSA W WO CONTRAST; Future  2. Sudden-onset sensorineural hearing loss  3. 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 Tinnitus of both ears      This is a very pleasant 64 y.o. female here today for evaluation of the the above-noted complaints. I independently reviewed her audiogram from today and it shows evidence of a dead ear on the right. This is a drop from her hearing from a prior audiogram which she brought in I independently reviewed from 2019. She has significant hearing loss on the left as well. She works in respiratory therapy, and it present, her hearing loss is so significant that it impacts her ability to complete her job. As such I have asked that she hold off on working until we can see if we get her hearing better. On exam she has evidence of a right external auditory canal mass. This appears to extend to the tympanic membrane. This may be something related to her tympanoplasty or could represent an underlying neoplastic or infectious process. I would like her to continue her antibiotics for this. I will plan to start her on high-dose steroids for her hearing loss. Additionally I will start her on a PPI for prophylaxis. Finally I will plan to obtain a CT IAC to evaluate for any underlying pathology. The risks of high dose steroids were discussed with the patient in detail.  Specifically, the risks of mood changes, memory behavioral or other psychological effects, weight/appetite gain, increased risk of glaucoma or cataracts, blood sugar elevations, osteoporosis, aseptic  femoral head necrosis, peptic ulcer/GI distress, fluid retention, adrenal gland suppression and increased risk of infections. The patient expressed understanding of the risks and wished to proceed with therapy. SUBJECTIVE/OBJECTIVE:  HPI    Reuben Benites is here today for evaluation of this very pleasant here today for evaluation of sudden sensorineural hearing loss on the right side. She has a history of right-sided ear surgery, and the sounds like tympanoplasty. She notes that her sudden hearing loss is been going on for several days. She was started on antibiotic and is taken several doses of this and not noticed any difference. She denies any dizziness or aural fullness. The following notes were reviewed and available in the EMR    Reuben Benites noted tinnitus, dizziness, imbalance, history of ear surgery and family history of hearing loss. Patient reports a sudden decrease in hearing in the right ear. This began on 2/27/21. Patient has seen her dispensing audiologist to make sure her hearing aids are working appropriately. She also notes onset of unsteadiness on Saturday. She also brought her most recent audiogram with her from 2019. 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 She states about 25 years ago she had a ruptured ear drum and subsequent surgery AD. Patient states she mostly reads lips for communication. Patient is wearing [employer] BTE hearing aids.        IMPRESSIONS:       AD: Profound SNHL, No measurable word recognition, Type A tymp  AS: Moderate sloping to Profound SNHL with mixed component at 500 Hz, Very Poor WRS, Type Ad tymp with large volume  :    REVIEW OF SYSTEMS  The following systems were reviewed and revealed the following in addition to any already discussed in the HPI:    PHYSICAL EXAM    GENERAL: No acute distress, alert and oriented, no hoarseness, strong voice  EYES: EOMI, Anti-icteric  HENT:   Head: Normocephalic and atraumatic.    Face:  Symmetric, facial nerve intact, no sinus tenderness  Right Ear: Normal external ear, normal external auditory canal, intact tympanic membrane with normal mobility and aerated middle ear  Left Ear: Normal external ear, normal external auditory canal, intact tympanic membrane with normal mobility and aerated middle ear  Mouth/Oral Cavity:  normal lips, Uvula is midline, no mucosal lesions, no trismus, normal dentition, normal salivary quality/flow  Oropharynx/Larynx:  normal oropharynx, normal tonsils; patient did not tolerate mirror exam due to excessive gag reflex  Nose:Normal external nasal appearance. Anterior rhinoscopy shows  a deviated septum preventing view posteriorly. Normal turbinates. Normal mucosa   NECK: Normal range of motion, no thyromegaly, trachea is midline, no lymphadenopathy, no neck masses, no crepitus  CHEST: Normal respiratory effort, no retractions, breathing comfortably  SKIN: No rashes, normal appearing skin, no evidence of skin lesions/tumors  Neuro:  cranial nerve II-XII intact; normal gait  Cardio:  no edema        PROCEDURE  Binocular otoscopic exam CPT 89437: The right ear was examined with the binocular otoscope. There is evidence of a large ballotable mass emanating from the external auditory canal.  Unable to visualize the tympanic membrane as a result of this. The left ear was then examined. The external auditory canal was normal.  The tympanic membrane was intact with an aerated middle ear. This note was generated completely or in part utilizing Dragon dictation speech recognition software. Occasionally, words are mistranscribed and despite editing, the text may contain inaccuracies due to incorrect word recognition. If further clarification is needed please contact the office at [phone]. An electronic signature was used to authenticate this note.     --Kp Springer MD

## 2021-03-02 NOTE — PATIENT INSTRUCTIONS
Cochlear Implant Evaluation (CIE): [facility]    You may be a candidate for a cochlear implant. To determine candidacy, please schedule an appointment for a CIE at [address] [place] [address]  Phone: [phone]  Hours:  8:00-5:00  Monday through Friday [place] [address] [place])  [address] [facility] [address]  [place], [address]   Phone: [phone]  Hours:  8:00-5:00  Monday through Friday     What is a cochlear implant? A cochlear implant is a small, complex electronic device that can help to provide a sense of sound to a person who is profoundly deaf or severely hard-of-hearing. The implant consists of an external portion that sits behind the ear and a second portion that is surgically placed under the skin (see figure). An implant has the following parts:  ? A microphone, which picks up sound from the environment. ? A speech processor, which selects and arranges sounds picked up by the microphone. ? A transmitter and /stimulator, which receive signals from the speech processor and convert them into electric impulses. ? An electrode array, which is a group of electrodes that collects the impulses from the stimulator and sends them to different regions of the auditory nerve. An implant does not restore normal hearing. Instead, it can give a person with hearing loss a useful representation of sounds in the environment and help him or her to understand speech. How does a cochlear implant work? A cochlear implant is very different from a hearing aid. Hearing aids amplify sounds so they may be detected by damaged ears. Cochlear implants bypass damaged portions of the ear and directly stimulate the auditory nerve. Signals generated by the implant are sent by way of the auditory nerve to the brain, which recognizes the signals as sound. Hearing through a cochlear implant is different from normal hearing and takes time to learn or relearn. However, it allows many people to recognize warning signals, understand other sounds in the environment, and understand speech in person or over the telephone. How does someone receive a cochlear implant? Use of a cochlear implant requires both a surgical procedure and significant therapy to learn or relearn the sense of hearing. Not everyone performs at the same level with this device. The decision to receive an implant should involve discussions with medical specialists, including an experienced cochlear-implant surgeon. The process can be expensive. For example, a person's health insurance may cover the expense, but not always. Some individuals may choose not to have a cochlear implant for a variety of personal reasons. Surgical implantations are almost always safe, although complications are a risk factor, just as with any kind of surgery. An additional consideration is learning to interpret the sounds created by an implant. This process takes time and practice. Speech-language pathologists and audiologists are frequently involved in this learning process. Prior to implantation, all of these factors need to be considered.   Source: NIH/NIDCD  Hearing Loss: Care Instructions  Your Care Instructions Hearing loss is a sudden or slow decrease in how well you hear. It can range from mild to profound. Permanent hearing loss can occur with aging, and it can happen when you are exposed long-term to loud noise. Examples include listening to loud music, riding motorcycles, or being around other loud machines. Hearing loss can affect your work and home life. It can make you feel lonely or depressed. You may feel that you have lost your independence. But hearing aids and other devices can help you hear better and feel connected to others. Follow-up care is a key part of your treatment and safety. Be sure to make and go to all appointments, and call your doctor if you are having problems. It's also a good idea to know your test results and keep a list of the medicines you take. How can you care for yourself at home? · Avoid loud noises whenever possible. This helps keep your hearing from getting worse. Always wear hearing protection around loud noises. · If appropriate, wear hearing aid(s) as directed. It is recommended that hearing aids are worn during all waking hours to keep your brain active and give it access to the sounds it is missing. · If you are beginning your process with hearing aid(s), schedule a \"Hearing Aid Evaluation\" with an audiologist to discuss your lifestyle, features of hearing aid technology, and styles of hearing aids available. It is recommended that you contact your insurance company to determine if you have a hearing aid benefit, as this may dictate who you can see for these services. · Have hearing tests as your doctor suggests. They can show whether your hearing has changed. Your hearing aid may need to be adjusted. · Use other assistive devices as needed. These may include:  ? Telephone amplifiers and hearing aids that can connect to a television, stereo, radio, or microphone. ? Devices that use lights or vibrations. These alert you to the doorbell, a ringing telephone, or a baby monitor. ? Television closed-captioning. This shows the words at the bottom of the screen. Most new TVs can do this. ? TTY (text telephone). This lets you type messages back and forth on the telephone instead of talking or listening. These devices are also called TDD. When messages are typed on the keyboard, they are sent over the phone line to a receiving TTY. The message is shown on a monitor. · Use pagers, fax machines, text, and email if it is hard for you to communicate by telephone. · Try to learn a listening technique called speech-reading. It is not lip-reading. You pay attention to people's gestures, expressions, posture, and tone of voice. These clues can help you understand what a person is saying. Face the person you are talking to, and have him or her face you. Make sure the lighting is good. You need to see the other person's face clearly. · Think about counseling if you need help to adjust to your hearing loss. When should you call for help? Watch closely for changes in your health, and be sure to contact your doctor if:    · You think your hearing is getting worse. · You have new symptoms, such as dizziness or nausea. Tinnitus: Overview and Management Strategies          Many people have some ringing sounds in their ears once in a while. You may hear a roar, a hiss, a tinkle, or a buzz. The sound usually lasts only a few minutes. If it goes on all the time, you may have tinnitus. Tinnitus is usually caused by long-term exposure to loud noise. This damages the nerves in the inner ear. It can occur with all types of hearing loss. It may be a symptom of almost any ear problem. Tinnitus may be caused by a buildup of earwax. Or, it may be caused by ear infections or certain medicines (especially antibiotics or large amounts of aspirin). You can also hear noises in your ears because of an injury to the ears, drinking too much alcohol or caffeine, or a medical condition. Other conditions may also contribute to tinnitus, including: head and neck trauma, temporomandibular joint disorder (TMJ), sinus pressure and barometric trauma, traumatic brain injury, metabolic disorders, autoimmune disorders, stress, and high blood pressure. You may need tests to evaluate your hearing and to find causes of long-lasting tinnitus. Your doctor may suggest one or more treatments to help you cope with the tinnitus. You can also do things at home to help reduce symptoms. Causes of Tinnitus  We do not know the exact cause of tinnitus. One thing we do know is that you are not imagining it. If you have tinnitus, chances are the cause will remain a mystery. Conditions that might cause tinnitus include the following:   ? Hearing loss   ? Ménière's disease   ? Loud noise exposure   ? Migraines   ? Head injury   ? Drugs or medicines that are toxic to hearing   ? Anemia   ? High blood pressure   ? Stress   ? A lot of wax in the ear   ? Certain types of tumors   ? Having a lot of caffeine   ? Smoking cigarettes  You may find that your tinnitus is worse at night. This happens because it is quiet and you are not distracted. Feeling tired and stressed may make your tinnitus worse. Follow-up care is a key part of your treatment and safety. Be sure to make and go to all appointments, and call your doctor if you are having problems. It's also a good idea to know your test results and keep a list of the medicines you take. How can you care for yourself at home? · Limit or cut out alcohol, caffeine, and sodium. They can make your symptoms worse. · Do not smoke or use other tobacco products. Nicotine reduces blood flow to the ear and makes tinnitus worse. If you need help quitting, talk to your doctor about stop-smoking programs and medicines. These can increase your chances of quitting for good. · Talk to your doctor about whether to stop taking aspirin and similar products such as ibuprofen or naproxen. · Get exercise often. It can help improve blood flow to the ear. Hearing Aids and Other Devices  A hearing aid may help your tinnitus if you have a hearing loss. An audiologist can help you find and use the best hearing aid for you. Tinnitus maskers look like hearing aids. They make a sound that masks, or covers up, the tinnitus. The masking sound distracts you from the ringing in your ears. You may be able to use a masker and a hearing aid at the same time. Sound machines can be useful at night or during quiet times. There are machines you can buy at the store. Or, you can find apps on your phone that make sounds, like the ocean or rainfall. Fish tanks, fans, quiet music, and indoor waterfalls can help, as well. Ways to manage/cope with tinnitus  Some tinnitus may last a long time. To manage your tinnitus, try to:  · Avoid noises that you think caused your tinnitus. If you can't avoid loud noises, wear earplugs or earmuffs. · Ignore the sound by paying attention to other things. Keeping your brain busy with other tasks or background noise can help your brain not focus on the tinnitus. · Try to not give the tinnitus an emotional reaction. Do your best to ignore the sound and not let it bother you. Relax using biofeedback, meditation, or yoga. Feeling stressed and being tired can make tinnitus worse. · Play music or white noise to help you sleep. Background noise may cover up the noise that you hear in your ears. You can buy a tabletop machine or a device that sits under your pillow to play soothing sounds, like ocean waves. · Smart phones have free apps, such as Whist, Relax Melodies, ReSound Relief, and White Noise Lite. These apps have different types of sounds/noise, some of which you can blend together to find sounds that are most soothing to you. · Hearing aid technology, especially when there is some hearing loss, may help reduce tinnitus symptoms by giving your brain better access to the sounds it is missing. There are some hearing aids with built-in noise generator programs, which may help when amplification alone is not enough. Additional resources may be found through the American Tinnitus Association at www.rogelio.org    When should you call for help? Call 911 anytime you think you may need emergency care. For example, call if:    · You have symptoms of a stroke. These may include:  ? Sudden numbness, tingling, weakness, or loss of movement in your face, arm, or leg, especially on only one side of your body. ? Sudden vision changes. ? Sudden trouble speaking. ? Sudden confusion or trouble understanding simple statements. ? Sudden problems with walking or balance. ? A sudden, severe headache that is different from past headaches. Call your doctor now or seek immediate medical care if:    · You develop other symptoms. These may include hearing loss (or worse hearing loss), balance problems, dizziness, nausea, or vomiting. Watch closely for changes in your health, and be sure to contact your doctor if:    · Your tinnitus moves from both ears to one ear. · Your hearing loss gets worse within 1 day after an ear injury. · Your tinnitus or hearing loss does not get better within 1 week after an ear injury. · Your tinnitus bothers you enough that you want to take medicines to help you cope with it. If you notice changes in your tinnitus and/or your hearing, it is recommended that you have your hearing tested by your audiologist and to follow-up with your physician that manages your hearing loss (such as your ENT or Primary Care doctor). Learning About Hearing Aids  What is a hearing aid? A hearing aid makes sounds louder. It can help some people with hearing problems to hear better. Hearing aids do not restore normal hearing. But they can make it easier to communicate by making sounds clearer. · Digital programmable hearing aids can adjust themselves to work best where you are at any time. You also have more choices in setting them up than with analog hearing aids. There are also different styles of hearing aids. · A behind-the-ear (BTE) hearing aid connects to a plastic ear mold that fits inside the outer ear. BTE hearing aids are used for all levels of hearing loss, especially very severe hearing loss. They may be better for children for safety and growth reasons. Poorly fitting BTE ear molds or a buildup of earwax may cause a whistling sound (feedback). · An in-the-ear (ITE) hearing aid fits in the outer part of the ear. It can be used by people with mild to severe hearing loss. ITE hearing aids can be used with other hearing devices, such as a telecoil that improves hearing during phone calls. ITE hearing aids can be damaged by earwax and fluid draining from the ear. Their small size may be hard for some people to handle. They are not often used in children because the case must be replaced as the child grows. · An in-the-canal (ITC) hearing aid fits into the ear canal. ITC hearing aids are used by people with mild to moderate hearing loss. They are made to fit the shape and the size of your ear canal. They can be damaged by earwax and fluid draining from the ear. Their small size may be hard for some people to handle. They are not made for children. You have some options if you have a hearing problem and are thinking about getting hearing aids. You can go to your doctor or an audiologist. He or she will do a hearing test and help you decide which type and style of hearing aid may be best for you. What else should I know about hearing aids? Find out if your insurance covers hearing aids. They can be expensive. Different types of hearing aids come with different costs. Also find out about a warranty or return policy in case you are not happy with your hearing aids. Follow-up care is a key part of your treatment and safety. Be sure to make and go to all appointments, and call your doctor if you are having problems. It's also a good idea to know your test results and keep a list of the medicines you take. Where can you learn more? Go to https://[employer].SpotBanks. org and sign in to your [employer] account. Enter L742 in the [employer] box to learn more about \"Learning About Hearing Aids. \"     If you do not have an account, please click on the \"Sign Up Now\" link. Current as of: October 21, 2018  Content Version: 12.1  © 2015-6303 Healthwise, Incorporated. Care instructions adapted under license by [facility] ([facility]). If you have questions about a medical condition or this instruction, always ask your healthcare professional. [place] [address] any warranty or liability for your use of this information.       Good Communication Strategies Some additional items that may be helpful:   - Remain patient - this is important for both parties   - Write down items that still cannot be heard/understood. You may write with pen/paper or consider typing/texting on a cell phone or smart device. - If background noise is unavoidable, try to keep yourself in a good position in the room. By sitting at a jones on the side of the restaurant (preferably a corner), it will be easier to communicate than if you were sitting at a table in the middle with background noise surrounding you. Keep yourself positioned away from music speakers or heavy foot traffic. Dizziness: Care Instructions  Your Care Instructions  Dizziness is the feeling of unsteadiness or fuzziness in your head. It is different than having vertigo, which is a feeling that the room is spinning or that you are moving or falling. It is also different from lightheadedness, which is the feeling that you are about to faint. It can be hard to know what causes dizziness. Some people feel dizzy when they have migraine headaches. Sometimes bouts of flu can make you feel dizzy. Some medical conditions, such as heart problems or high blood pressure, can make you feel dizzy. Many medicines can cause dizziness, including medicines for high blood pressure, pain, or anxiety. If a medicine causes your symptoms, your doctor may recommend that you stop or change the medicine. If it is a problem with your heart, you may need medicine to help your heart work better. If there is no clear reason for your symptoms, your doctor may suggest watching and waiting for a while to see if the dizziness goes away on its own. Follow-up care is a key part of your treatment and safety. Be sure to make and go to all appointments, and call your doctor if you are having problems. It's also a good idea to know your test results and keep a list of the medicines you take. How can you care for yourself at home? · If your doctor recommends or prescribes medicine, take it exactly as directed. Call your doctor if you think you are having a problem with your medicine. · Do not drive while you feel dizzy. · Try to prevent falls. Steps you can take include:  ? Using nonskid mats, adding grab bars near the tub, and using night-lights. ? Clearing your home so that walkways are free of anything you might trip on.  ? Letting family and friends know that you have been feeling dizzy. This will help them know how to help you. When should you call for help? Call 911 anytime you think you may need emergency care. For example, call if:    · You passed out (lost consciousness). · You have dizziness along with symptoms of a heart attack. These may include:  ? Chest pain or pressure, or a strange feeling in the chest.  ? Sweating. ? Shortness of breath. ? Nausea or vomiting. ? Pain, pressure, or a strange feeling in the back, neck, jaw, or upper belly or in one or both shoulders or arms. ? Lightheadedness or sudden weakness. ? A fast or irregular heartbeat. · You have symptoms of a stroke. These may include:  ? Sudden numbness, tingling, weakness, or loss of movement in your face, arm, or leg, especially on only one side of your body. ? Sudden vision changes. ? Sudden trouble speaking. ? Sudden confusion or trouble understanding simple statements. ? Sudden problems with walking or balance. ? A sudden, severe headache that is different from past headaches. Call your doctor now or seek immediate medical care if:    · You feel dizzy and have a fever, headache, or ringing in your ears. · You have new or increased nausea and vomiting. · Your dizziness does not go away or comes back. Watch closely for changes in your health, and be sure to contact your doctor if:    · You do not get better as expected. Where can you learn more? Go to https://chpepiceweb.CityHeroes. org and sign in to your [employer] account. Enter R282 in the [facility] box to learn more about \"Dizziness: Care Instructions. \"     If you do not have an account, please click on the \"Sign Up Now\" link. Current as of: September 23, 2018  Content Version: 11.9  © 0757-7927 [employer]. Care instructions adapted under license by [facility] ([facility]). If you have questions about a medical condition or this instruction, always ask your healthcare professional. [address] any warranty or liability for your use of this information. Patient Education        Preventing Falls: Care Instructions  Your Care Instructions     Getting around your home safely can be a challenge if you have injuries or health problems that make it easy for you to fall. Loose rugs and furniture in walkways are among the dangers for many older people who have problems walking or who have poor eyesight. People who have conditions such as arthritis, osteoporosis, or dementia also have to be careful not to fall. You can make your home safer with a few simple measures. Follow-up care is a key part of your treatment and safety. Be sure to make and go to all appointments, and call your doctor if you are having problems. It's also a good idea to know your test results and keep a list of the medicines you take. How can you care for yourself at home? Taking care of yourself  · You may get dizzy if you do not drink enough water. To prevent dehydration, drink plenty of fluids, enough so that your urine is light yellow or clear like water. Choose water and other caffeine-free clear liquids. If you have kidney, heart, or liver disease and have to limit fluids, talk with your doctor before you increase the amount of fluids you drink. · Exercise regularly to improve your strength, muscle tone, and balance. Walk if you can. Swimming may be a good choice if you cannot walk easily. · Have your vision and hearing checked each year or any time you notice a change. If you have trouble seeing and hearing, you might not be able to avoid objects and could lose your balance. · Know the side effects of the medicines you take. Ask your doctor or pharmacist whether the medicines you take can affect your balance. Sleeping pills or sedatives can affect your balance. · Limit the amount of alcohol you drink. Alcohol can impair your balance and other senses. · Ask your doctor whether calluses or corns on your feet need to be removed. If you wear loose-fitting shoes because of calluses or corns, you can lose your balance and fall. · Talk to your doctor if you have numbness in your feet. Preventing falls at home  · Remove raised doorway thresholds, throw rugs, and clutter. Repair loose carpet or raised areas in the floor. · Move furniture and electrical cords to keep them out of walking paths. · Use nonskid floor wax, and wipe up spills right away, especially on ceramic tile floors. · If you use a walker or cane, put rubber tips on it. If you use crutches, clean the bottoms of them regularly with an abrasive pad, such as steel wool. · Keep your house well lit, especially [facility], and outside walkways. Use night-lights in areas such as hallways and bathrooms. Add extra light switches or use remote switches (such as switches that go on or off when you clap your hands) to make it easier to turn lights on if you have to get up during the night. · Install sturdy handrails on stairways. · Move items in your cabinets so that the things you use a lot are on the lower shelves (about waist level).

## 2021-03-05 ENCOUNTER — HOSPITAL ENCOUNTER (OUTPATIENT)
Dept: CT IMAGING | Age: 61
Discharge: HOME | End: 2021-03-05
Payer: COMMERCIAL

## 2021-03-05 ENCOUNTER — TELEPHONE (OUTPATIENT)
Dept: ENT CLINIC | Age: 61
End: 2021-03-05

## 2021-03-05 DIAGNOSIS — H93.8X1 EAR MASS, RIGHT: ICD-10-CM

## 2021-03-05 PROCEDURE — 70482 CT ORBIT/EAR/FOSSA W/O&W/DYE: CPT

## 2021-03-05 PROCEDURE — 6360000004 HC RX CONTRAST MEDICATION: Performed by: INTERNAL MEDICINE

## 2021-03-05 RX ADMIN — IOPAMIDOL 75 ML: 755 INJECTION, SOLUTION INTRAVENOUS at 08:49

## 2021-03-05 NOTE — TELEPHONE ENCOUNTER
Called patient to change her appointment. Dr. Nayeli Gage starts surgery that day at 11:30am. Asked patient to call the office.

## 2021-03-05 NOTE — PROGRESS NOTES
Irina Ward   1960, 64 y.o. female   <[id number]>       Referring Provider: Juliana Ricketts MD  Referral Type: In an order in [address] [place] [address]    Reason for Visit: Evaluation of suspected change in hearing, tinnitus, or balance. ADULT AUDIOLOGIC EVALUATION      Irina Ward is a 64 y.o. female seen today, 3/11/2021 , for a recheck audiologic evaluation. Patient was seen by Juliana Ricktets MD following today's evaluation. AUDIOLOGIC AND OTHER PERTINENT MEDICAL HISTORY:      Irina Ward noted some improvement in hearing in the right ear since her last audiogram on 3/2/21. Date: 3/11/2021     IMPRESSIONS:      AD: Severe sloping to Profound MHL, Very Poor WRS, Type Ad tymp  AS: Moderate sloping to Profound MHL, Very Poor WRS, Type A tymp    Hearing loss consistent with bilateral MHL. Hearing loss significant enough to create hearing difficulty in most listening situations. Discussed hearing loss with patient. Patient to follow medical recommendations per Juliana Ricketts MD .    ASSESSMENT AND FINDINGS:     Otoscopy revealed: Clear ear canals bilaterally    RIGHT EAR:  Hearing Sensitivity: Severe to Profound Mixed hearing loss  Speech Recognition Threshold: 90 dB HL  Word Recognition: Very Poor (0-59%), based on NU-6 25-word list at 105m dBHL (equipment limit) using recorded speech stimuli. Tympanometry: Normal peak pressure with high compliance, Type Ad tympanogram, consistent with hypermobile tympanic membrane. Acoustic Reflexes: Ipsilateral: Did not test. Contralateral: Did not test.    LEFT EAR:  Hearing Sensitivity: Moderate to Profound Mixed hearing loss  Speech Recognition Threshold: 75 dB HL  Word Recognition: Very Poor (0-59%), based on NU-6 25-word list at 105m dBHL (equipment limit) using recorded speech stimuli. Tympanometry: Normal peak pressure and compliance, Type A tympanogram, consistent with normal middle ear function.   Acoustic Reflexes: Ipsilateral: Did not test. Contralateral: Did not test. Reliability: Good  Transducer: Inserts    See scanned audiogram dated 3/11/2021  for results. PATIENT EDUCATION:       The following items were discussed with the patient:    - Good Communication Strategies   - Hearing Loss and Hearing Aids    Educational information was shared in the After Visit Summary. RECOMMENDATIONS:                                                                                                                                                                                                                                                            The following items are recommended based on patient report and results from today's appointment:   - Continue medical follow-up with Akash Faith MD.   - Retest hearing as medically indicated and/or sooner if a change in hearing is noted. - Continue hearing aid use and follow-up with dispensing audiologist as needed.        Tg Keen  Audiologist    Chart CC'd to: Akash Faith MD      Degree of   Hearing Sensitivity dB Range   Within Normal Limits (WNL) 0 - 20   Mild 20 - 40   Moderate 40 - 55   Moderately-Severe 55 - 70   Severe 70 - 90   Profound 90 +

## 2021-03-11 ENCOUNTER — PROCEDURE VISIT (OUTPATIENT)
Dept: AUDIOLOGY | Age: 61
End: 2021-03-11
Payer: COMMERCIAL

## 2021-03-11 ENCOUNTER — OFFICE VISIT (OUTPATIENT)
Dept: ENT CLINIC | Age: 61
End: 2021-03-11
Payer: COMMERCIAL

## 2021-03-11 VITALS
WEIGHT: 166 LBS | HEIGHT: 65 IN | HEART RATE: 93 BPM | SYSTOLIC BLOOD PRESSURE: 145 MMHG | BODY MASS INDEX: 27.66 KG/M2 | DIASTOLIC BLOOD PRESSURE: 85 MMHG | TEMPERATURE: 97.1 F

## 2021-03-11 DIAGNOSIS — H90.6 MIXED CONDUCTIVE AND SENSORINEURAL HEARING LOSS OF BOTH EARS: Primary | ICD-10-CM

## 2021-03-11 DIAGNOSIS — H93.13 TINNITUS OF BOTH EARS: ICD-10-CM

## 2021-03-11 DIAGNOSIS — H90.A31 MIXED CONDUCTIVE AND SENSORINEURAL HEARING LOSS OF RIGHT EAR WITH RESTRICTED HEARING OF LEFT EAR: ICD-10-CM

## 2021-03-11 DIAGNOSIS — H91.20 SUDDEN-ONSET SENSORINEURAL HEARING LOSS: ICD-10-CM

## 2021-03-11 DIAGNOSIS — H91.90 HEARING LOSS, UNSPECIFIED HEARING LOSS TYPE, UNSPECIFIED LATERALITY: Primary | ICD-10-CM

## 2021-03-11 DIAGNOSIS — H93.8X1 EAR MASS, RIGHT: ICD-10-CM

## 2021-03-11 PROCEDURE — 99214 OFFICE O/P EST MOD 30 MIN: CPT | Performed by: STUDENT IN AN ORGANIZED HEALTH CARE EDUCATION/TRAINING PROGRAM

## 2021-03-11 PROCEDURE — 92567 TYMPANOMETRY: CPT | Performed by: AUDIOLOGIST

## 2021-03-11 PROCEDURE — 92557 COMPREHENSIVE HEARING TEST: CPT | Performed by: AUDIOLOGIST

## 2021-03-11 RX ORDER — PREDNISONE 10 MG/1
TABLET ORAL
Qty: 60 TABLET | Refills: 0 | Status: SHIPPED | OUTPATIENT
Start: 2021-03-11 | End: 2021-06-25

## 2021-03-11 NOTE — PATIENT INSTRUCTIONS
Good Communication Strategies    Communication can be challenging for anyone, but can be especially difficult for those with some degree of hearing loss. While we may not be able to control every factor that may lead to difficulty with communication, there are Good Communication Strategies that we can all use in our day-to-day lives. Communication takes both parties working together for it to be successful. Tips as a Listener:   1. Control your environment. It is important to limit the amount of background noise in the room when possible. You should also consider having a good light source in the room to best see the other person. 2. Ask for clarification. Instead of saying \"What?\", you can use parts of what you heard to make a new question. For example, if you heard the word \"Thursday\" but not the rest of the week, you may ask \"What was that about Thursday? \" or \"What did you want to do Thursday? \". This shows the person talking that you are listening and will help them better explain what they are saying. 3. Be an advocate for yourself. If you are hearing but not understanding, tell the other person \"I can hear you, but I need you to slow down when you speak. \"  Or if someone is facing the other direction, say \"I cannot hear you when you are not looking at me when we talk. \"       Tips as a Talker:   - Sit or stand 3 to 6 feet away to maximize audibility         -- It is unrealistic to believe someone else will fully hear your message if you are speaking from across the room or in a different room in the house   - Stay at eye level to help with visual cues   - Make sure you have the persons attention before speaking   - Use facial expressions and gestures to accentuate your message   - Raise your voice slightly (do not scream)   - Speak slowly and distinctly   - Use short, simple sentences   - Rephrase your words if the person is having a hard time understanding you    - To avoid distortion, dont speak directly into a persons ear      Some additional items that may be helpful:   - Remain patient - this is important for both parties   - Write down items that still cannot be heard/understood. You may write with pen/paper or consider typing/texting on a cell phone or smart device. - If background noise is unavoidable, try to keep yourself in a good position in the room. By sitting at a jones on the side of the restaurant (preferably a corner), it will be easier to communicate than if you were sitting at a table in the middle with background noise surrounding you. Keep yourself positioned away from music speakers or heavy foot traffic. Hearing Loss: Care Instructions  Your Care Instructions      Hearing loss is a sudden or slow decrease in how well you hear. It can range from mild to profound. Permanent hearing loss can occur with aging, and it can happen when you are exposed long-term to loud noise. Examples include listening to loud music, riding motorcycles, or being around other loud machines. Hearing loss can affect your work and home life. It can make you feel lonely or depressed. You may feel that you have lost your independence. But hearing aids and other devices can help you hear better and feel connected to others. Follow-up care is a key part of your treatment and safety. Be sure to make and go to all appointments, and call your doctor if you are having problems. It's also a good idea to know your test results and keep a list of the medicines you take. How can you care for yourself at home? · Avoid loud noises whenever possible. This helps keep your hearing from getting worse. Always wear hearing protection around loud noises. · If appropriate, wear hearing aid(s) as directed. It is recommended that hearing aids are worn during all waking hours to keep your brain active and give it access to the sounds it is missing.       · If you are beginning your process with hearing aid(s), schedule a \"Hearing Aid Evaluation\" with an audiologist to discuss your lifestyle, features of hearing aid technology, and styles of hearing aids available. It is recommended that you contact your insurance company to determine if you have a hearing aid benefit, as this may dictate who you can see for these services. · Have hearing tests as your doctor suggests. They can show whether your hearing has changed. Your hearing aid may need to be adjusted. · Use other assistive devices as needed. These may include:  ? Telephone amplifiers and hearing aids that can connect to a television, stereo, radio, or microphone. ? Devices that use lights or vibrations. These alert you to the doorbell, a ringing telephone, or a baby monitor. ? Television closed-captioning. This shows the words at the bottom of the screen. Most new TVs can do this. ? TTY (text telephone). This lets you type messages back and forth on the telephone instead of talking or listening. These devices are also called TDD. When messages are typed on the keyboard, they are sent over the phone line to a receiving TTY. The message is shown on a monitor. · Use pagers, fax machines, text, and email if it is hard for you to communicate by telephone. · Try to learn a listening technique called speech-reading. It is not lip-reading. You pay attention to people's gestures, expressions, posture, and tone of voice. These clues can help you understand what a person is saying. Face the person you are talking to, and have him or her face you. Make sure the lighting is good. You need to see the other person's face clearly. · Think about counseling if you need help to adjust to your hearing loss. When should you call for help? Watch closely for changes in your health, and be sure to contact your doctor if:    · You think your hearing is getting worse. · You have new symptoms, such as dizziness or nausea.            Cochlear Implant Evaluation (CIE): [facility]    You may be a candidate for a cochlear implant. To determine candidacy, please schedule an appointment for a CIE at [address] [place] [address]. [address] [place] [address]  Phone: [phone]  Hours:  8:00-5:00  Monday through Friday Manny Fields [address] [employer])  [address] [facility] [address] [facility]  [facility] [address]  [employer], [address]  Phone: [phone]  Hours:  8:00-5:00  Monday through Friday     What is a cochlear implant? A cochlear implant is a small, complex electronic device that can help to provide a sense of sound to a person who is profoundly deaf or severely hard-of-hearing. The implant consists of an external portion that sits behind the ear and a second portion that is surgically placed under the skin (see figure). An implant has the following parts:   A microphone, which picks up sound from the environment.  A speech processor, which selects and arranges sounds picked up by the microphone.  A transmitter and /stimulator, which receive signals from the speech processor and convert them into electric impulses.  An electrode array, which is a group of electrodes that collects the impulses from the stimulator and sends them to different regions of the auditory nerve. An implant does not restore normal hearing. Instead, it can give a person with hearing loss a useful representation of sounds in the environment and help him or her to understand speech. How does a cochlear implant work? A cochlear implant is very different from a hearing aid. Hearing aids amplify sounds so they may be detected by damaged ears. Cochlear implants bypass damaged portions of the ear and directly stimulate the auditory nerve. Signals generated by the implant are sent by way of the auditory nerve to the brain, which recognizes the signals as sound.  Hearing through a cochlear implant is different from normal hearing and takes time to learn or relearn. However, it allows many people to recognize warning signals, understand other sounds in the environment, and understand speech in person or over the telephone. How does someone receive a cochlear implant? Use of a cochlear implant requires both a surgical procedure and significant therapy to learn or relearn the sense of hearing. Not everyone performs at the same level with this device. The decision to receive an implant should involve discussions with medical specialists, including an experienced cochlear-implant surgeon. The process can be expensive. For example, a person's health insurance may cover the expense, but not always. Some individuals may choose not to have a cochlear implant for a variety of personal reasons. Surgical implantations are almost always safe, although complications are a risk factor, just as with any kind of surgery. An additional consideration is learning to interpret the sounds created by an implant. This process takes time and practice. Speech-language pathologists and audiologists are frequently involved in this learning process. Prior to implantation, all of these factors need to be considered.   Source: NIH/NIDCD

## 2021-03-11 NOTE — Clinical Note
Dr. Hernandez Cherry,    Please see note from this patient's audiogram from today. Please let me know if there is anything further you need.         Tg Vital  Audiologist

## 2021-03-12 ENCOUNTER — TELEPHONE (OUTPATIENT)
Dept: ENT CLINIC | Age: 61
End: 2021-03-12

## 2021-03-12 NOTE — TELEPHONE ENCOUNTER
Paperwork needs to be faxed to place handling her short term disability. They need documentation of when her next appointment is.

## 2021-03-14 NOTE — PROGRESS NOTES
[address] [facility] NECK SURGERY  CONSULT      Booker Wall (:  1960) is a 64 y.o. female, here for evaluation of the following chief complaint(s):  Hearing Problem (had hearing test )      ASSESSMENT/PLAN:  1. Hearing loss, unspecified hearing loss type, unspecified laterality  2. Ear mass, right  3. Sudden-onset sensorineural hearing loss  4. 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 Tinnitus of both ears      This is a very pleasant 64 y.o. female here today for evaluation of the the above-noted complaints. I independently reviewed her audiogram from today and it shows evidence of a dead ear on the right. This is a drop from her hearing from a prior audiogram which she brought in I independently reviewed from 2019. She has significant hearing loss on the left as well. She works in respiratory therapy, and it present, her hearing loss is so significant that it impacts her ability to complete her job. As such I have asked that she hold off on working until we can see if we get her hearing better. She has had some improvement since starting high-dose steroids. Her hearing is not quite back to baseline but is overall significantly improved. I reviewed her CT IAC shows some outward bowing of the reconstructed tympanic membrane without any obvious middle ear pathology. The ossicles do appear to abut the superior aspect of the neotympanic membrane. We discussed that we can consider an intratympanic steroid injection, however due to the fact that she has had surgery in the ear she is hesitant to proceed with this and I feel that is reasonable. The risks of high dose steroids were discussed with the patient in detail.  Specifically, the risks of mood changes, memory behavioral or other psychological effects, weight/appetite gain, increased risk of glaucoma or cataracts, blood sugar elevations, osteoporosis, PHYSICAL EXAM    GENERAL: No acute distress, alert and oriented, no hoarseness, strong voice  EYES: EOMI, Anti-icteric  HENT:   Head: Normocephalic and atraumatic. Face:  Symmetric, facial nerve intact, no sinus tenderness  Right Ear: Normal external ear, normal external auditory canal, intact tympanic membrane with normal mobility and aerated middle ear  Left Ear: Normal external ear, normal external auditory canal, intact tympanic membrane with normal mobility and aerated middle ear  Mouth/Oral Cavity:  normal lips, Uvula is midline, no mucosal lesions, no trismus, normal dentition, normal salivary quality/flow  Oropharynx/Larynx:  normal oropharynx, normal tonsils; patient did not tolerate mirror exam due to excessive gag reflex  Nose:Normal external nasal appearance. Anterior rhinoscopy shows  a deviated septum preventing view posteriorly. Normal turbinates. Normal mucosa   NECK: Normal range of motion, no thyromegaly, trachea is midline, no lymphadenopathy, no neck masses, no crepitus  CHEST: Normal respiratory effort, no retractions, breathing comfortably  SKIN: No rashes, normal appearing skin, no evidence of skin lesions/tumors  Neuro:  cranial nerve II-XII intact; normal gait  Cardio:  no edema        PROCEDURE  Binocular otoscopic exam CPT 68715: The right ear was examined with the binocular otoscope. There is evidence of a large ballotable mass emanating from the external auditory canal.  Unable to visualize the tympanic membrane as a result of this. The left ear was then examined. The external auditory canal was normal.  The tympanic membrane was intact with an aerated middle ear. This note was generated completely or in part utilizing Dragon dictation speech recognition software. Occasionally, words are mistranscribed and despite editing, the text may contain inaccuracies due to incorrect word recognition.   If further clarification is needed please contact the office at [phone]. An electronic signature was used to authenticate this note.     --Tu Vazquez MD

## 2021-03-24 NOTE — PROGRESS NOTES
Rito Hutchison   1960, 64 y.o. female   <[id number]>       Referring Provider: Nancy Barker MD  Referral Type: In an order in [address] [place] [address]    Reason for Visit: Evaluation of suspected change in hearing, tinnitus, or balance. ADULT AUDIOLOGIC EVALUATION      Rito Hutchison is a 64 y.o. female seen today, 3/25/2021 , for a recheck audiologic evaluation. Patient was seen by Nancy Barker MD following today's evaluation. AUDIOLOGIC AND OTHER PERTINENT MEDICAL HISTORY:      Rito Hutchison noted improved hearing AU. Date: 3/25/2021     IMPRESSIONS:      AD: Severe sloping to Profound MHL, Poor WRS  AS: Moderate sloping to Profound SNHL, Very Poor WRS    Hearing loss consistent with MHL AD and SNHL AS. Hearing loss significant enough to create hearing difficulty in most listening situations. Discussed hearing loss with patient. Patient to return to dispensing audiologist for hearing aid adjustments. Patient to follow medical recommendations per Nancy Barker MD .    ASSESSMENT AND FINDINGS:     Otoscopy revealed: Clear ear canals bilaterally    RIGHT EAR:  Hearing Sensitivity: Severe to Profound Mixed hearing loss  Speech Recognition Threshold: 85 dB HL  Word Recognition: Poor (60-69%), based on NU-6 25-word list at 150m dBHL using recorded speech stimuli. Tympanometry: Did not test.  Acoustic Reflexes: Ipsilateral: Did not test. Contralateral: Did not test.    LEFT EAR:  Hearing Sensitivity: Moderate to Profound Sensorinueral hearing loss  Speech Recognition Threshold: 80 dB HL  Word Recognition: Very Poor (0-59%), based on NU-6 25-word list at 105m dBHL using recorded speech stimuli. Tympanometry: Did not test.  Acoustic Reflexes: Ipsilateral: Did not test. Contralateral: Did not test.    Reliability: Good  Transducer: Inserts    See scanned audiogram dated 3/25/2021  for results.         PATIENT EDUCATION:       The following items were discussed with the patient:    - Good Communication Strategies   - Hearing Loss and Hearing Aids    Educational information was shared in the After Visit Summary. RECOMMENDATIONS:                                                                                                                                                                                                                                                            The following items are recommended based on patient report and results from today's appointment:   - Continue medical follow-up with Dayanara Katz MD.   - Retest hearing as medically indicated and/or sooner if a change in hearing is noted. - Continue hearing aid use and follow-up with dispensing audiologist as needed. - Utilize \"Good Communication Strategies\" as discussed to assist in speech understanding with communication partners.        Tg Haji  Audiologist    Chart CC'd to: Dayanara Katz MD      Degree of   Hearing Sensitivity dB Range   Within Normal Limits (WNL) 0 - 20   Mild 20 - 40   Moderate 40 - 55   Moderately-Severe 55 - 70   Severe 70 - 90   Profound 90 +

## 2021-03-25 ENCOUNTER — PROCEDURE VISIT (OUTPATIENT)
Dept: AUDIOLOGY | Age: 61
End: 2021-03-25
Payer: COMMERCIAL

## 2021-03-25 ENCOUNTER — OFFICE VISIT (OUTPATIENT)
Dept: ENT CLINIC | Age: 61
End: 2021-03-25
Payer: COMMERCIAL

## 2021-03-25 VITALS
WEIGHT: 167 LBS | SYSTOLIC BLOOD PRESSURE: 152 MMHG | DIASTOLIC BLOOD PRESSURE: 90 MMHG | TEMPERATURE: 97.4 F | HEART RATE: 85 BPM | OXYGEN SATURATION: 99 % | BODY MASS INDEX: 27.79 KG/M2

## 2021-03-25 DIAGNOSIS — H93.8X1 EAR MASS, RIGHT: ICD-10-CM

## 2021-03-25 DIAGNOSIS — H90.A22 SENSORINEURAL HEARING LOSS (SNHL) OF LEFT EAR WITH RESTRICTED HEARING OF RIGHT EAR: Primary | ICD-10-CM

## 2021-03-25 DIAGNOSIS — H91.20 SUDDEN-ONSET SENSORINEURAL HEARING LOSS: ICD-10-CM

## 2021-03-25 DIAGNOSIS — H90.A31 MIXED CONDUCTIVE AND SENSORINEURAL HEARING LOSS OF RIGHT EAR WITH RESTRICTED HEARING OF LEFT EAR: ICD-10-CM

## 2021-03-25 DIAGNOSIS — H91.90 HEARING LOSS, UNSPECIFIED HEARING LOSS TYPE, UNSPECIFIED LATERALITY: Primary | ICD-10-CM

## 2021-03-25 DIAGNOSIS — H93.13 TINNITUS OF BOTH EARS: ICD-10-CM

## 2021-03-25 PROCEDURE — 99214 OFFICE O/P EST MOD 30 MIN: CPT | Performed by: STUDENT IN AN ORGANIZED HEALTH CARE EDUCATION/TRAINING PROGRAM

## 2021-03-25 PROCEDURE — 92557 COMPREHENSIVE HEARING TEST: CPT | Performed by: AUDIOLOGIST

## 2021-03-25 NOTE — Clinical Note
Dr. Alee Ruiz,    Please see note from this patient's audiogram from today. Please let me know if there is anything further you need.         Tg Keen  Audiologist

## 2021-03-25 NOTE — PROGRESS NOTES
[address] [facility] NECK SURGERY  CONSULT      Randee Kat (:  1960) is a 64 y.o. female, here for evaluation of the following chief complaint(s):  Hearing Problem (had hearing eval )      ASSESSMENT/PLAN:  1. Hearing loss, unspecified hearing loss type, unspecified laterality  2. Ear mass, right  3. Sudden-onset sensorineural hearing loss  4. 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 Tinnitus of both ears      This is a very pleasant 64 y.o. female here today for evaluation of the the above-noted complaints. She has had significant improvement since starting high-dose steroids, and her word recognition score went from 48% to 60% in her right ear. Her hearing is not quite back to baseline but is overall significantly improved. I reviewed her CT IAC shows some outward bowing of the reconstructed tympanic membrane without any obvious middle ear pathology. The ossicles do appear to abut the superior aspect of the neotympanic membrane. We discussed that we can consider an intratympanic steroid injection, however due to the fact that she has had surgery in the ear she is hesitant to proceed with this and I feel that is reasonable. She is set to finish out her last several days of high-dose steroids. The risk benefits and alternatives discussed with her regarding high-dose steroid usage in the prior notes were again reemphasized. I think that she is cleared to return to work in a capacity the tries to minimize distractions, such as avoidance of ER settings. SUBJECTIVE/OBJECTIVE:  HPI    Randee Kat is here today for evaluation of this very pleasant here today for evaluation of sudden sensorineural hearing loss on the right side. She has a history of right-sided ear surgery, and the sounds like tympanoplasty. She notes that her sudden hearing loss is been going on for several days.   She was started on normal external auditory canal, intact tympanic membrane with normal mobility and aerated middle ear  Left Ear: Normal external ear, normal external auditory canal, intact tympanic membrane with normal mobility and aerated middle ear  Mouth/Oral Cavity:  normal lips, Uvula is midline, no mucosal lesions, no trismus, normal dentition, normal salivary quality/flow  Oropharynx/Larynx:  normal oropharynx, normal tonsils; patient did not tolerate mirror exam due to excessive gag reflex  Nose:Normal external nasal appearance. Anterior rhinoscopy shows  a deviated septum preventing view posteriorly. Normal turbinates. Normal mucosa   NECK: Normal range of motion, no thyromegaly, trachea is midline, no lymphadenopathy, no neck masses, no crepitus  CHEST: Normal respiratory effort, no retractions, breathing comfortably  SKIN: No rashes, normal appearing skin, no evidence of skin lesions/tumors  Neuro:  cranial nerve II-XII intact; normal gait  Cardio:  no edema        PROCEDURE  Binocular otoscopic exam CPT 41004: From initial visit    The right ear was examined with the binocular otoscope. There is evidence of a large ballotable mass emanating from the external auditory canal.  Unable to visualize the tympanic membrane as a result of this. The left ear was then examined. The external auditory canal was normal.  The tympanic membrane was intact with an aerated middle ear. This note was generated completely or in part utilizing Dragon dictation speech recognition software. Occasionally, words are mistranscribed and despite editing, the text may contain inaccuracies due to incorrect word recognition. If further clarification is needed please contact the office at [phone]. An electronic signature was used to authenticate this note.     --Flor Palomo MD

## 2021-03-25 NOTE — PATIENT INSTRUCTIONS
Good Communication Strategies    Communication can be challenging for anyone, but can be especially difficult for those with some degree of hearing loss. While we may not be able to control every factor that may lead to difficulty with communication, there are Good Communication Strategies that we can all use in our day-to-day lives. Communication takes both parties working together for it to be successful. Tips as a Listener:   1. Control your environment. It is important to limit the amount of background noise in the room when possible. You should also consider having a good light source in the room to best see the other person. 2. Ask for clarification. Instead of saying \"What?\", you can use parts of what you heard to make a new question. For example, if you heard the word \"Thursday\" but not the rest of the week, you may ask \"What was that about Thursday? \" or \"What did you want to do Thursday? \". This shows the person talking that you are listening and will help them better explain what they are saying. 3. Be an advocate for yourself. If you are hearing but not understanding, tell the other person \"I can hear you, but I need you to slow down when you speak. \"  Or if someone is facing the other direction, say \"I cannot hear you when you are not looking at me when we talk. \"       Tips as a Talker:   - Sit or stand 3 to 6 feet away to maximize audibility         -- It is unrealistic to believe someone else will fully hear your message if you are speaking from across the room or in a different room in the house   - Stay at eye level to help with visual cues   - Make sure you have the persons attention before speaking   - Use facial expressions and gestures to accentuate your message   - Raise your voice slightly (do not scream)   - Speak slowly and distinctly   - Use short, simple sentences   - Rephrase your words if the person is having a hard time understanding you    - To avoid distortion, dont speak directly into a persons ear      Some additional items that may be helpful:   - Remain patient - this is important for both parties   - Write down items that still cannot be heard/understood. You may write with pen/paper or consider typing/texting on a cell phone or smart device. - If background noise is unavoidable, try to keep yourself in a good position in the room. By sitting at a jones on the side of the restaurant (preferably a corner), it will be easier to communicate than if you were sitting at a table in the middle with background noise surrounding you. Keep yourself positioned away from music speakers or heavy foot traffic. Hearing Loss: Care Instructions  Your Care Instructions      Hearing loss is a sudden or slow decrease in how well you hear. It can range from mild to profound. Permanent hearing loss can occur with aging, and it can happen when you are exposed long-term to loud noise. Examples include listening to loud music, riding motorcycles, or being around other loud machines. Hearing loss can affect your work and home life. It can make you feel lonely or depressed. You may feel that you have lost your independence. But hearing aids and other devices can help you hear better and feel connected to others. Follow-up care is a key part of your treatment and safety. Be sure to make and go to all appointments, and call your doctor if you are having problems. It's also a good idea to know your test results and keep a list of the medicines you take. How can you care for yourself at home? · Avoid loud noises whenever possible. This helps keep your hearing from getting worse. Always wear hearing protection around loud noises. · If appropriate, wear hearing aid(s) as directed. It is recommended that hearing aids are worn during all waking hours to keep your brain active and give it access to the sounds it is missing.       · If you are beginning your process with hearing aid(s), schedule a \"Hearing Aid Evaluation\" with an audiologist to discuss your lifestyle, features of hearing aid technology, and styles of hearing aids available. It is recommended that you contact your insurance company to determine if you have a hearing aid benefit, as this may dictate who you can see for these services. · Have hearing tests as your doctor suggests. They can show whether your hearing has changed. Your hearing aid may need to be adjusted. · Use other assistive devices as needed. These may include:  ? Telephone amplifiers and hearing aids that can connect to a television, stereo, radio, or microphone. ? Devices that use lights or vibrations. These alert you to the doorbell, a ringing telephone, or a baby monitor. ? Television closed-captioning. This shows the words at the bottom of the screen. Most new TVs can do this. ? TTY (text telephone). This lets you type messages back and forth on the telephone instead of talking or listening. These devices are also called TDD. When messages are typed on the keyboard, they are sent over the phone line to a receiving TTY. The message is shown on a monitor. · Use pagers, fax machines, text, and email if it is hard for you to communicate by telephone. · Try to learn a listening technique called speech-reading. It is not lip-reading. You pay attention to people's gestures, expressions, posture, and tone of voice. These clues can help you understand what a person is saying. Face the person you are talking to, and have him or her face you. Make sure the lighting is good. You need to see the other person's face clearly. · Think about counseling if you need help to adjust to your hearing loss. When should you call for help? Watch closely for changes in your health, and be sure to contact your doctor if:    · You think your hearing is getting worse. · You have new symptoms, such as dizziness or nausea.

## 2021-03-31 ENCOUNTER — TELEPHONE (OUTPATIENT)
Dept: FAMILY MEDICINE CLINIC | Age: 61
End: 2021-03-31

## 2021-03-31 ENCOUNTER — TELEPHONE (OUTPATIENT)
Dept: ENT CLINIC | Age: 61
End: 2021-03-31

## 2021-03-31 DIAGNOSIS — I10 HYPERTENSION, UNSPECIFIED TYPE: ICD-10-CM

## 2021-03-31 RX ORDER — METOPROLOL SUCCINATE 25 MG/1
25 TABLET, EXTENDED RELEASE ORAL DAILY
Qty: 90 TABLET | Refills: 1 | Status: SHIPPED | OUTPATIENT
Start: 2021-03-31

## 2021-03-31 NOTE — TELEPHONE ENCOUNTER
Former patient of Dr Brittany Santiago that needs her Toprox XL 25 mg sent to [facility] instead of [facility] . She is [facility] employee and must use [facility]. She is scheduled to see you on 6/25/21.       Please call, [phone]

## 2021-05-06 ENCOUNTER — OFFICE VISIT (OUTPATIENT)
Dept: ENT CLINIC | Age: 61
End: 2021-05-06
Payer: COMMERCIAL

## 2021-05-06 VITALS
DIASTOLIC BLOOD PRESSURE: 76 MMHG | SYSTOLIC BLOOD PRESSURE: 167 MMHG | HEART RATE: 80 BPM | WEIGHT: 169 LBS | BODY MASS INDEX: 28.12 KG/M2 | TEMPERATURE: 97 F

## 2021-05-06 DIAGNOSIS — H91.20 SUDDEN-ONSET SENSORINEURAL HEARING LOSS: ICD-10-CM

## 2021-05-06 DIAGNOSIS — H91.90 HEARING LOSS, UNSPECIFIED HEARING LOSS TYPE, UNSPECIFIED LATERALITY: Primary | ICD-10-CM

## 2021-05-06 DIAGNOSIS — H93.13 TINNITUS OF BOTH EARS: ICD-10-CM

## 2021-05-06 DIAGNOSIS — H90.A31 MIXED CONDUCTIVE AND SENSORINEURAL HEARING LOSS OF RIGHT EAR WITH RESTRICTED HEARING OF LEFT EAR: ICD-10-CM

## 2021-05-06 PROCEDURE — 99213 OFFICE O/P EST LOW 20 MIN: CPT | Performed by: STUDENT IN AN ORGANIZED HEALTH CARE EDUCATION/TRAINING PROGRAM

## 2021-05-06 NOTE — PROGRESS NOTES
[address] [facility] NECK SURGERY  CONSULT      Geovany Griffith (:  1960) is a 64 y.o. female, here for evaluation of the following chief complaint(s):  Follow-up (for ears doing much better )      ASSESSMENT/PLAN:  1. Hearing loss, unspecified hearing loss type, unspecified laterality  2. Sudden-onset sensorineural hearing loss  3. 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 Tinnitus of both ears      This is a very pleasant 64 y.o. female here today for evaluation of the the above-noted complaints. She has had significant improvement since starting high-dose steroids, and her word recognition score went from 48% to 60% in her right ear. Her hearing is not quite back to baseline but is overall significantly improved. I reviewed her CT IAC shows some outward bowing of the reconstructed tympanic membrane without any obvious middle ear pathology. The ossicles do appear to abut the superior aspect of the neotympanic membrane. We discussed that we can consider an intratympanic steroid injection, however due to the fact that she has had surgery in the ear she is hesitant to proceed with this and I feel that is reasonable. She is now back at work and feels overall things are going pretty well. She is able to function even in noisy environments but sometimes needs to ask you to take the mask off. Overall she is cleared to return to work for full duties and she will contact me should she have issues going forward. SUBJECTIVE/OBJECTIVE:  HPI    Geovany Griffith is here today for evaluation of this very pleasant here today for evaluation of sudden sensorineural hearing loss on the right side. She has a history of right-sided ear surgery, and the sounds like tympanoplasty. She notes that her sudden hearing loss is been going on for several days.   She was started on antibiotic and is taken several doses of this and not noticed any difference. She denies any dizziness or aural fullness. The following notes were reviewed and available in the EMR    Mari Jiang noted tinnitus, dizziness, imbalance, history of ear surgery and family history of hearing loss. Patient reports a sudden decrease in hearing in the right ear. This began on 2/27/21. Patient has seen her dispensing audiologist to make sure her hearing aids are working appropriately. She also notes onset of unsteadiness on Saturday. She also brought her most recent audiogram with her from 2019. 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 She states about 25 years ago she had a ruptured ear drum and subsequent surgery AD. Patient states she mostly reads lips for communication. Patient is wearing myThings[employer] hearing aids. AD: Profound SNHL, No measurable word recognition, Type A tymp  AS: Moderate sloping to Profound SNHL with mixed component at 500 Hz, Very Poor WRS, Type Ad tymp with large volume    Update 3/14/2021:    Patient is here today for follow-up. She had a repeat audiogram which showed significant improvement in her right ear hearing after undergoing high-dose steroid therapy. Update 3/25/2021:    Patient presents today for follow-up. She has had significant improvement in her right-sided hearing compared to when I initially saw her. Her word recognition score increased from 48 to 60%. She states that she feels like she is pretty close to her baseline before this whole episode began. Update 5/6/2021:    Since I last saw the patient she completed her steroids and has gone back to work. She is doing very well.   Her hearing essentially is back to baseline    REVIEW OF SYSTEMS  The following systems were reviewed and revealed the following in addition to any already discussed in the HPI:    PHYSICAL EXAM    GENERAL: No acute distress, alert and oriented, no hoarseness, strong voice  EYES: EOMI, Anti-icteric  HENT:   Head:

## 2021-06-25 ENCOUNTER — OFFICE VISIT (OUTPATIENT)
Dept: FAMILY MEDICINE CLINIC | Age: 61
End: 2021-06-25
Payer: COMMERCIAL

## 2021-06-25 VITALS
HEIGHT: 65 IN | WEIGHT: 167.8 LBS | BODY MASS INDEX: 27.96 KG/M2 | SYSTOLIC BLOOD PRESSURE: 138 MMHG | DIASTOLIC BLOOD PRESSURE: 88 MMHG | TEMPERATURE: 97.8 F

## 2021-06-25 DIAGNOSIS — I10 ESSENTIAL HYPERTENSION: Primary | ICD-10-CM

## 2021-06-25 PROCEDURE — 99213 OFFICE O/P EST LOW 20 MIN: CPT | Performed by: INTERNAL MEDICINE

## 2021-06-25 SDOH — ECONOMIC STABILITY: FOOD INSECURITY: WITHIN THE PAST 12 MONTHS, THE FOOD YOU BOUGHT JUST DIDN'T LAST AND YOU DIDN'T HAVE MONEY TO GET MORE.: NEVER TRUE

## 2021-06-25 SDOH — ECONOMIC STABILITY: FOOD INSECURITY: WITHIN THE PAST 12 MONTHS, YOU WORRIED THAT YOUR FOOD WOULD RUN OUT BEFORE YOU GOT MONEY TO BUY MORE.: NEVER TRUE

## 2021-06-25 ASSESSMENT — ENCOUNTER SYMPTOMS
SHORTNESS OF BREATH: 0
COUGH: 0
SORE THROAT: 0
ABDOMINAL PAIN: 0
RHINORRHEA: 0
APNEA: 0
WHEEZING: 0
BLOOD IN STOOL: 0
DIARRHEA: 0
SINUS PAIN: 0
SINUS PRESSURE: 0

## 2021-06-25 ASSESSMENT — SOCIAL DETERMINANTS OF HEALTH (SDOH): HOW HARD IS IT FOR YOU TO PAY FOR THE VERY BASICS LIKE FOOD, HOUSING, MEDICAL CARE, AND HEATING?: NOT HARD AT ALL

## 2021-06-25 ASSESSMENT — PATIENT HEALTH QUESTIONNAIRE - PHQ9
1. LITTLE INTEREST OR PLEASURE IN DOING THINGS: 0
SUM OF ALL RESPONSES TO PHQ QUESTIONS 1-9: 0
2. FEELING DOWN, DEPRESSED OR HOPELESS: 0
SUM OF ALL RESPONSES TO PHQ9 QUESTIONS 1 & 2: 0

## 2021-06-25 NOTE — PROGRESS NOTES
Immunization History   Administered Date(s) Administered    COVID-19, Moderna, PF, 100mcg/0.5mL 12/24/2020, 01/21/2021    Influenza A ([id number]) Vaccine PF IM 10/22/2009    Influenza Virus Vaccine 10/18/2018, 10/16/2019, 10/02/2020

## 2021-06-25 NOTE — PATIENT INSTRUCTIONS
Thank you for choosing [facility]. Please bring a current list of medications to every appointment. Please contact your pharmacy for any prescription refill(s) that you are requesting.

## 2021-06-25 NOTE — PROGRESS NOTES
Encounter Medications as of 6/25/2021   Medication Sig Dispense Refill    metoprolol succinate (TOPROL XL) 25 MG extended release tablet Take 1 tablet by mouth daily 90 tablet 1    MILK THISTLE PO Take by mouth      fluticasone (FLONASE) 50 MCG/ACT nasal spray 1 spray by Nasal route as needed for Rhinitis      MULTIPLE VITAMIN PO Take  by mouth daily.  guaiFENesin (MUCINEX) 600 MG SR tablet Take 1,200 mg by mouth daily as needed.  [DISCONTINUED] predniSONE (DELTASONE) 10 MG tablet 6 tabs per day X 7 days, then decrease by 1 q day. Take in AM. 60 tablet 0    [DISCONTINUED] omeprazole (PRILOSEC) 40 MG delayed release capsule Take 40mg 30 minutes prior to evening meal. (Patient not taking: Reported on 6/25/2021) 30 capsule 1    [DISCONTINUED] ZINC PO Take by mouth       No facility-administered encounter medications on file as of 6/25/2021. Orders Placed This Encounter   Procedures    Basic Metabolic Panel     Standing Status:   Future     Standing Expiration Date:   6/25/2022       No follow-ups on file. Outpatient Encounter Medications as of 6/25/2021   Medication Sig Dispense Refill    metoprolol succinate (TOPROL XL) 25 MG extended release tablet Take 1 tablet by mouth daily 90 tablet 1    MILK THISTLE PO Take by mouth      fluticasone (FLONASE) 50 MCG/ACT nasal spray 1 spray by Nasal route as needed for Rhinitis      MULTIPLE VITAMIN PO Take  by mouth daily.  guaiFENesin (MUCINEX) 600 MG SR tablet Take 1,200 mg by mouth daily as needed.  [DISCONTINUED] predniSONE (DELTASONE) 10 MG tablet 6 tabs per day X 7 days, then decrease by 1 q day. Take in AM. 60 tablet 0    [DISCONTINUED] omeprazole (PRILOSEC) 40 MG delayed release capsule Take 40mg 30 minutes prior to evening meal. (Patient not taking: Reported on 6/25/2021) 30 capsule 1    [DISCONTINUED] ZINC PO Take by mouth       No facility-administered encounter medications on file as of 6/25/2021.      Orders Placed This Encounter   Procedures    Basic Metabolic Panel     Standing Status:   Future     Standing Expiration Date:   6/25/2022       Subjective   SUBJECTIVE/OBJECTIVE:  HPI   Chief Complaint   Patient presents with    Check-Up     hypertension- patient will have lipids checked at [facility] screening. patient denies any complaints at this time       Review of Systems   Constitutional: Negative for chills, diaphoresis, fatigue and fever. HENT: Negative for congestion, postnasal drip, rhinorrhea, sinus pressure, sinus pain and sore throat. Eyes: Negative for visual disturbance. Respiratory: Negative for apnea, cough, shortness of breath and wheezing. Cardiovascular: Negative for chest pain and palpitations. Gastrointestinal: Negative for abdominal pain, blood in stool and diarrhea. Genitourinary: Negative for dysuria, frequency, hematuria and urgency. Musculoskeletal: Negative for arthralgias and myalgias. Skin: Negative for rash. Neurological: Negative for dizziness, numbness and headaches. Objective   Physical Exam  Constitutional:       General: She is not in acute distress. Appearance: Normal appearance. HENT:      Head: Normocephalic and atraumatic. Eyes:      Extraocular Movements: Extraocular movements intact. Pupils: Pupils are equal, round, and reactive to light. Cardiovascular:      Rate and Rhythm: Normal rate and regular rhythm. Pulmonary:      Effort: No respiratory distress. Breath sounds: No wheezing. Abdominal:      General: There is no distension. Tenderness: There is no abdominal tenderness. There is no guarding or rebound. Skin:     Coloration: Skin is not jaundiced. Findings: No rash. Neurological:      Mental Status: She is alert.                   An electronic signature was used to authenticate this note.    --Curry Rey DO

## 2021-07-09 LAB
CHOLESTEROL, TOTAL: 204 MG/DL (ref 0–199)
GLUCOSE BLD-MCNC: 87 MG/DL (ref 70–99)
HDLC SERPL-MCNC: 65 MG/DL (ref 40–60)
LDL CHOLESTEROL CALCULATED: 126 MG/DL
TRIGL SERPL-MCNC: 63 MG/DL (ref 0–150)

## 2021-08-10 ENCOUNTER — TELEPHONE (OUTPATIENT)
Dept: FAMILY MEDICINE CLINIC | Age: 61
End: 2021-08-10

## 2021-08-10 NOTE — TELEPHONE ENCOUNTER
----- Message from Beth Padron sent at 8/10/2021  9:10 AM EDT -----  Subject: Message to Provider    QUESTIONS  Information for Provider? Patient stated she has sinus infection. Symptoms   are yellow drainage, cough. Patient would like to know if something can   just be called into pharmacy instead of coming in. Patient would like a   call back today  ---------------------------------------------------------------------------  --------------  CALL BACK INFO  What is the best way for the office to contact you? OK to leave message on   voicemail  Preferred Call Back Phone Number? [phone]  ---------------------------------------------------------------------------  --------------  SCRIPT ANSWERS  Relationship to Patient?  Self

## 2022-02-24 ENCOUNTER — TELEMEDICINE (OUTPATIENT)
Dept: FAMILY MEDICINE CLINIC | Age: 62
End: 2022-02-24
Payer: COMMERCIAL

## 2022-02-24 ENCOUNTER — TELEPHONE (OUTPATIENT)
Dept: FAMILY MEDICINE CLINIC | Age: 62
End: 2022-02-24

## 2022-02-24 DIAGNOSIS — R19.7 DIARRHEA, UNSPECIFIED TYPE: ICD-10-CM

## 2022-02-24 DIAGNOSIS — B96.89 ACUTE BACTERIAL SINUSITIS: Primary | ICD-10-CM

## 2022-02-24 DIAGNOSIS — J01.90 ACUTE BACTERIAL SINUSITIS: Primary | ICD-10-CM

## 2022-02-24 PROCEDURE — 3017F COLORECTAL CA SCREEN DOC REV: CPT | Performed by: FAMILY MEDICINE

## 2022-02-24 PROCEDURE — 99213 OFFICE O/P EST LOW 20 MIN: CPT | Performed by: FAMILY MEDICINE

## 2022-02-24 PROCEDURE — G8427 DOCREV CUR MEDS BY ELIG CLIN: HCPCS | Performed by: FAMILY MEDICINE

## 2022-02-24 RX ORDER — AMOXICILLIN 500 MG/1
500 CAPSULE ORAL 3 TIMES DAILY
Qty: 30 CAPSULE | Refills: 0 | Status: SHIPPED | OUTPATIENT
Start: 2022-02-24 | End: 2022-03-06

## 2022-02-24 NOTE — PROGRESS NOTES
Subjective:      Patient ID: Josh Hopper is a 63 y.o. female. Chief Complaint   Patient presents with    Congestion     cough - pale yellow thick phlegm, stuffy ears, headache, runny nose, fever, sore throat x 4 days    Diarrhea     since Monday         Patient presents with:  Congestion: cough - pale yellow thick phlegm, stuffy ears, headache, runny nose, fever, sore throat x 4 days  Diarrhea: since Monday     Here for the above. Cough productive for 4 days  Nasal congestion. Some sore throat. Temp 100.1  No smoke   She did smoke in past  Ear pressure. She points to the forehead at the headache  She has had ha in past like this but usually not    Diarrhea watery started on Monday   Every few hours  If does not eat food then no diarrhea  She is taking some gatorade like fluids  No v no n   No abd pain   She thinks it is better today     She tells me she takes the bp and is 120-130's over the 80's at home    YOB: 1960    Date of Visit:  2/24/2022    No Known Allergies    Current Outpatient Medications:  MILK THISTLE PO, Take by mouth, Disp: , Rfl:   fluticasone (FLONASE) 50 MCG/ACT nasal spray, 1 spray by Nasal route as needed for Rhinitis, Disp: , Rfl:   MULTIPLE VITAMIN PO, Take  by mouth daily. , Disp: , Rfl:   guaiFENesin (MUCINEX) 600 MG SR tablet, Take 1,200 mg by mouth daily as needed. , Disp: , Rfl:   metoprolol succinate (TOPROL XL) 25 MG extended release tablet, Take 1 tablet by mouth daily (Patient not taking: Reported on 2/24/2022), Disp: 90 tablet, Rfl: 1    No current facility-administered medications for this visit. There were no vitals filed for this visit. There is no height or weight on file to calculate BMI.      Wt Readings from Last 3 Encounters:  06/25/21 : 167 lb 12.8 oz (76.1 kg)  05/06/21 : 169 lb (76.7 kg)  03/25/21 : 167 lb (75.8 kg)    BP Readings from Last 3 Encounters:  06/25/21 : 138/88  05/06/21 : (!) 167/76  03/25/21 : (!) 152/90                    Review of Systems    Objective:   Physical Exam  Constitutional:       General: She is not in acute distress. Appearance: Normal appearance. She is not ill-appearing. Comments: She is in no distress. No resp sx on the video   Neurological:      Mental Status: She is alert. Assessment:        Diagnosis Orders   1. Acute bacterial sinusitis     2. Diarrhea, unspecified type       Orders Placed This Encounter   Medications    amoxicillin (AMOXIL) 500 MG capsule     Sig: Take 1 capsule by mouth 3 times daily for 10 days     Dispense:  30 capsule     Refill:  0       Stable  Some of her illness is starting to improve, the diarrhea.     hemant is  being evaluated by a Virtual Visit (video visit) encounter to address concerns as mentioned above. A caregiver was present when appropriate. Due to this being a TeleHealth encounter ([facility]), evaluation of the following organ systems was limited: Vitals/Constitutional/EENT/Resp/CV/GI//MS/Neuro/Skin/Heme-Lymph-Imm. Pursuant to the emergency declaration under the [address] [place] [address], [address] [place] [address] authority and the [employer] Act, this Virtual Visit was conducted with patient's (and/or legal guardian's) consent, to reduce the patient's risk of exposure to COVID-19 and provide necessary medical care. The patient (and/or legal guardian) has also been advised to contact this office for worsening conditions or problems, and seek emergency medical treatment and/or call 911 if deemed necessary. also aware of billable service and includes applicable copay. Visit conducted with consent of patient or legal guardian. [place] Licensed to practice in [facility] that video visit conducts with         Services were provided through a video synchronous discussion virtually to substitute for in-person clinic visit. Patient and provider were located at their individual homes. Plan:      Do take fluids  Use the antibiotic  Call if not better  Avoid heavy foods  Call if not better by next 3 days         Gabby Rico MD

## 2022-02-24 NOTE — TELEPHONE ENCOUNTER
----- Message from Ivan Rhodes sent at 2/24/2022  8:45 AM EST -----  Subject: Appointment Request    Reason for Call: Urgent Abdominal Pain    QUESTIONS  Type of Appointment? Established Patient  Reason for appointment request? Other - Ecc cannot book  Additional Information for Provider? Could not book by ecc warm   transferred to office to get scheduled. screened red.  ---------------------------------------------------------------------------  --------------  CALL BACK INFO  What is the best way for the office to contact you? Do not leave any   message, patient will call back for answer  Preferred Call Back Phone Number? [phone]  ---------------------------------------------------------------------------  --------------  SCRIPT ANSWERS  Relationship to Patient? Self  Do you have pain that has started or worsened within the past 24 hours? No  Are you vomiting blood or have bloody or black stool? No  Have you recently (14 days) seen a provider for this pain? No  Have you been diagnosed with, awaiting test results for, or told that you   are suspected of having COVID-19 (Coronavirus)? (If patient has tested   negative or was tested as a requirement for work, school, or travel and   not based on symptoms, answer no)? No  Within the past 10 days have you developed any of the following symptoms   (answer no if symptoms have been present longer than 10 days or began   more than 10 days ago)? Fever or Chills, Cough, Shortness of breath or   difficulty breathing, Loss of taste or smell, Sore throat, Nasal   congestion, Sneezing or runny nose, Fatigue or generalized body aches   (answer no if pain is specific to a body part e.g. back pain), Diarrhea,   Headache?  Yes

## 2022-04-07 DIAGNOSIS — I10 ESSENTIAL HYPERTENSION: ICD-10-CM

## 2022-04-07 LAB
ANION GAP SERPL CALCULATED.3IONS-SCNC: 13 MMOL/L (ref 3–16)
BUN BLDV-MCNC: 20 MG/DL (ref 7–20)
CALCIUM SERPL-MCNC: 9.7 MG/DL (ref 8.3–10.6)
CHLORIDE BLD-SCNC: 105 MMOL/L (ref 99–110)
CO2: 23 MMOL/L (ref 21–32)
CREAT SERPL-MCNC: 0.8 MG/DL (ref 0.6–1.1)
GFR AFRICAN AMERICAN: >60
GFR NON-AFRICAN AMERICAN: >60
GLUCOSE BLD-MCNC: 96 MG/DL (ref 70–99)
POTASSIUM SERPL-SCNC: 4.7 MMOL/L (ref 3.5–5.1)
SODIUM BLD-SCNC: 141 MMOL/L (ref 136–145)

## 2022-11-28 ENCOUNTER — COMMUNITY OUTREACH (OUTPATIENT)
Dept: FAMILY MEDICINE CLINIC | Age: 62
End: 2022-11-28

## 2022-11-28 NOTE — PROGRESS NOTES
Patient's HM shows they are overdue for [place] [id number] and  files searched. No results to attach to order nor HM updated.

## 2023-01-09 ENCOUNTER — OFFICE VISIT (OUTPATIENT)
Dept: FAMILY MEDICINE CLINIC | Age: 63
End: 2023-01-09
Payer: COMMERCIAL

## 2023-01-09 VITALS
WEIGHT: 166.2 LBS | HEIGHT: 65 IN | BODY MASS INDEX: 27.69 KG/M2 | TEMPERATURE: 98.4 F | SYSTOLIC BLOOD PRESSURE: 146 MMHG | DIASTOLIC BLOOD PRESSURE: 90 MMHG

## 2023-01-09 DIAGNOSIS — Z13.220 SCREENING FOR LIPID DISORDERS: ICD-10-CM

## 2023-01-09 DIAGNOSIS — I10 HYPERTENSION, UNSPECIFIED TYPE: ICD-10-CM

## 2023-01-09 DIAGNOSIS — Z00.00 PHYSICAL EXAM: Primary | ICD-10-CM

## 2023-01-09 DIAGNOSIS — I10 ESSENTIAL HYPERTENSION: ICD-10-CM

## 2023-01-09 PROCEDURE — 3077F SYST BP >= 140 MM HG: CPT | Performed by: INTERNAL MEDICINE

## 2023-01-09 PROCEDURE — 3080F DIAST BP >= 90 MM HG: CPT | Performed by: INTERNAL MEDICINE

## 2023-01-09 PROCEDURE — G8482 FLU IMMUNIZE ORDER/ADMIN: HCPCS | Performed by: INTERNAL MEDICINE

## 2023-01-09 PROCEDURE — 99396 PREV VISIT EST AGE 40-64: CPT | Performed by: INTERNAL MEDICINE

## 2023-01-09 RX ORDER — METOPROLOL SUCCINATE 25 MG/1
25 TABLET, EXTENDED RELEASE ORAL DAILY
Qty: 90 TABLET | Refills: 1 | Status: SHIPPED | OUTPATIENT
Start: 2023-01-09

## 2023-01-09 SDOH — ECONOMIC STABILITY: FOOD INSECURITY: WITHIN THE PAST 12 MONTHS, THE FOOD YOU BOUGHT JUST DIDN'T LAST AND YOU DIDN'T HAVE MONEY TO GET MORE.: NEVER TRUE

## 2023-01-09 SDOH — ECONOMIC STABILITY: FOOD INSECURITY: WITHIN THE PAST 12 MONTHS, YOU WORRIED THAT YOUR FOOD WOULD RUN OUT BEFORE YOU GOT MONEY TO BUY MORE.: NEVER TRUE

## 2023-01-09 ASSESSMENT — ENCOUNTER SYMPTOMS
SHORTNESS OF BREATH: 0
SINUS PAIN: 0
NAUSEA: 0
ABDOMINAL PAIN: 0
DIARRHEA: 0
WHEEZING: 0
COUGH: 0
SINUS PRESSURE: 0
SORE THROAT: 0
CONSTIPATION: 0
BLOOD IN STOOL: 0
APNEA: 0
VOMITING: 0
RHINORRHEA: 0

## 2023-01-09 ASSESSMENT — PATIENT HEALTH QUESTIONNAIRE - PHQ9
1. LITTLE INTEREST OR PLEASURE IN DOING THINGS: 0
SUM OF ALL RESPONSES TO PHQ QUESTIONS 1-9: 0
2. FEELING DOWN, DEPRESSED OR HOPELESS: 0
SUM OF ALL RESPONSES TO PHQ9 QUESTIONS 1 & 2: 0
SUM OF ALL RESPONSES TO PHQ QUESTIONS 1-9: 0

## 2023-01-09 ASSESSMENT — SOCIAL DETERMINANTS OF HEALTH (SDOH): HOW HARD IS IT FOR YOU TO PAY FOR THE VERY BASICS LIKE FOOD, HOUSING, MEDICAL CARE, AND HEATING?: NOT HARD AT ALL

## 2023-01-09 NOTE — PATIENT INSTRUCTIONS
Thank you for choosing [facility]. Please bring a current list of medications to every appointment. Please contact your pharmacy for any prescription refill(s) that you are requesting.

## 2023-01-09 NOTE — PROGRESS NOTES
2023    Giulia Patricia (:  1960) is a 63 y.o. female, here for a preventive medicine evaluation. Chief Complaint   Patient presents with    Annual Exam     Physical exam- patient has not taken Toprol XL \"for a long time\"    Giulia Patricia is a 63 y.o. female with the following history as recorded in [facility]: There are no problems to display for this patient. Current Outpatient Medications   Medication Sig Dispense Refill    fluticasone (FLONASE) 50 MCG/ACT nasal spray 1 spray by Nasal route as needed for Rhinitis      MULTIPLE VITAMIN PO Take  by mouth daily. guaiFENesin (MUCINEX) 600 MG extended release tablet Take 1,200 mg by mouth daily as needed. metoprolol succinate (TOPROL XL) 25 MG extended release tablet Take 1 tablet by mouth daily (Patient not taking: Reported on 2023) 90 tablet 1     No current facility-administered medications for this visit. Allergies: Patient has no known allergies. Past Medical History:   Diagnosis Date    Allergic rhinitis     Hearing loss in      since birth, wears hearing aids    Hearing loss in right ear     wears hearing aids    Wears hearing aid     bilateral     Past Surgical History:   Procedure Laterality Date    COLONOSCOPY  2017    EXTERNAL EAR SURGERY Left     at birth    TONSILLECTOMY       Family History   Problem Relation Age of Onset    Heart Disease Father     Cancer Father         prostate cancer. Social History     Tobacco Use    Smoking status: Former     Packs/day: 0.50     Years: 30.00     Pack years: 15.00     Types: Cigarettes     Quit date: 2020     Years since quittin.4    Smokeless tobacco: Never   Substance Use Topics    Alcohol use: Yes     Alcohol/week: 4.0 standard drinks     Types: 4 Cans of beer per week     Comment: couple times per week      There is no problem list on file for this patient. Review of Systems   Constitutional:  Negative for chills, diaphoresis, fatigue and fever. HENT:  Negative for congestion, postnasal drip, rhinorrhea, sinus pressure, sinus pain and sore throat. Eyes:  Negative for visual disturbance. Respiratory:  Negative for apnea, cough, shortness of breath and wheezing. Cardiovascular:  Negative for chest pain and palpitations. Gastrointestinal:  Negative for abdominal pain, blood in stool, constipation, diarrhea, nausea and vomiting. Endocrine: Negative for polyuria. Genitourinary:  Negative for dysuria, frequency, hematuria and urgency. Musculoskeletal:  Negative for arthralgias and myalgias. Skin:  Negative for rash. Neurological:  Negative for dizziness, syncope, weakness, numbness and headaches. Hematological:  Negative for adenopathy. Prior to Visit Medications    Medication Sig Taking? Authorizing Provider   fluticasone (FLONASE) 50 MCG/ACT nasal spray 1 spray by Nasal route as needed for Rhinitis Yes Historical Provider, MD   MULTIPLE VITAMIN PO Take  by mouth daily. Yes Historical Provider, MD   guaiFENesin (MUCINEX) 600 MG extended release tablet Take 1,200 mg by mouth daily as needed.  Yes Historical Provider, MD   metoprolol succinate (TOPROL XL) 25 MG extended release tablet Take 1 tablet by mouth daily  Patient not taking: Reported on 2023  Kane Peters MD        No Known Allergies    Past Medical History:   Diagnosis Date    Allergic rhinitis     Hearing loss in      since birth, wears hearing aids    Hearing loss in right ear     wears hearing aids    Wears hearing aid     bilateral       Past Surgical History:   Procedure Laterality Date    COLONOSCOPY  2017    EXTERNAL EAR SURGERY Left     at birth    TONSILLECTOMY         Social History     Socioeconomic History    Marital status:      Spouse name: Not on file    Number of children: Not on file    Years of education: Not on file    Highest education level: Not on file   Occupational History    Not on file   Tobacco Use    Smoking status: Former Packs/day: 0.50     Years: 30.00     Pack years: 15.00     Types: Cigarettes     Quit date: 2020     Years since quittin.4    Smokeless tobacco: Never   Substance and Sexual Activity    Alcohol use: Yes     Alcohol/week: 4.0 standard drinks     Types: 4 Cans of beer per week     Comment: couple times per week    Drug use: No    Sexual activity: Not on file   Other Topics Concern    Not on file   Social History Narrative    Not on file     Social Determinants of Health     Financial Resource Strain: Low Risk     Difficulty of Paying Living Expenses: Not hard at all   Food Insecurity: No Food Insecurity    Worried About Running Out of Food in the Last Year: Never true    Ran Out of Food in the Last Year: Never true   Transportation Needs: Not on file   Physical Activity: Not on file   Stress: Not on file   Social Connections: Not on file   Intimate Partner Violence: Not on file   Housing Stability: Not on file        Family History   Problem Relation Age of Onset    Heart Disease Father     Cancer Father         prostate cancer. ADVANCE DIRECTIVE: N, <no information>    Vitals:    23 1036   BP: (!) 146/90   Site: Right Upper Arm   Position: Sitting   Cuff Size: Large Adult   Temp: 98.4 °F (36.9 °C)   TempSrc: Temporal   Weight: 166 lb 3.2 oz (75.4 kg)   Height: 5' 5\" (1.651 m)     Estimated body mass index is 27.66 kg/m² as calculated from the following:    Height as of this encounter: 5' 5\" (1.651 m). Weight as of this encounter: 166 lb 3.2 oz (75.4 kg). Physical Exam  Vitals and nursing note reviewed. Constitutional:       General: She is not in acute distress. Appearance: Normal appearance. She is not ill-appearing or diaphoretic. HENT:      Head: Normocephalic and atraumatic. Right Ear: Tympanic membrane and ear canal normal.      Left Ear: Tympanic membrane, ear canal and external ear normal.   Eyes:      Extraocular Movements: Extraocular movements intact. Conjunctiva/sclera: Conjunctivae normal.      Pupils: Pupils are equal, round, and reactive to light. Cardiovascular:      Rate and Rhythm: Normal rate and regular rhythm. Heart sounds: No murmur heard. Pulmonary:      Effort: Pulmonary effort is normal. No respiratory distress. Breath sounds: Normal breath sounds. No wheezing or rhonchi. Abdominal:      General: There is no distension. Tenderness: There is no abdominal tenderness. There is no rebound. Musculoskeletal:         General: No swelling or deformity. Cervical back: No rigidity. Skin:     Coloration: Skin is not jaundiced. Findings: No rash. Neurological:      General: No focal deficit present. Mental Status: She is alert and oriented to person, place, and time. Cranial Nerves: No cranial nerve deficit. Motor: No weakness. Psychiatric:         Mood and Affect: Mood normal.         Thought Content: Thought content normal.       No flowsheet data found.     Lab Results   Component Value Date/Time    CHOL 204 07/09/2021 07:40 AM    CHOL 202 05/06/2019 10:18 AM    CHOL 203 05/14/2018 10:05 AM    TRIG 63 07/09/2021 07:40 AM    TRIG 126 05/06/2019 10:18 AM    TRIG 123 05/14/2018 10:05 AM    HDL 65 07/09/2021 07:40 AM    HDL 69 05/06/2019 10:18 AM    HDL 76 05/14/2018 10:05 AM    HDL 74 06/06/2012 01:48 PM    HDL 72 06/08/2011 03:32 PM    HDL 56 07/20/2010 10:15 AM    LDLCALC 126 07/09/2021 07:40 AM    LDLCALC 108 05/06/2019 10:18 AM    LDLCALC 102 05/14/2018 10:05 AM    GLUCOSE 96 04/07/2022 10:39 AM       The 10-year ASCVD risk score (Sondra MOYER, et al., 2019) is: 3.9%    Values used to calculate the score:      Age: 63 years      Sex: Female      Is Non- : No      Diabetic: No      Tobacco smoker: No      Systolic Blood Pressure: 017 mmHg      Is BP treated: No      HDL Cholesterol: 65 mg/dL      Total Cholesterol: 204 mg/dL    Immunization History   Administered Date(s) Administered COVID-19, [address] [facility], Primary or Immunocompromised, (age 12y+), IM, 100 mcg/0.5mL 12/24/2020, 01/21/2021    COVID-19, PFIZER PURPLE top, DILUTE for use, (age 15 y+), 30mcg/0.3mL 11/28/2021    Influenza A ([id number]) Vaccine PF IM 10/22/2009    Influenza Virus Vaccine 10/18/2018, 10/16/2019, 10/02/2020    Influenza, FLUBLOK, (age 25 y+), PF, 0.5mL 10/22/2021, 10/20/2022       Health Maintenance   Topic Date Due    HIV screen  Never done    Hepatitis C screen  Never done    DTaP/Tdap/Td vaccine (1 - Tdap) Never done    Shingles vaccine (1 of 2) Never done    Breast cancer screen  11/26/2020    COVID-19 Vaccine (4 - Booster for Moderna series) 01/23/2022    Depression Screen  01/09/2024    Cervical cancer screen  02/25/2024    Lipids  07/09/2026    Colorectal Cancer Screen  06/29/2027    Flu vaccine  Completed    Hepatitis A vaccine  Aged Out    Hib vaccine  Aged Out    Meningococcal (ACWY) vaccine  Aged Out    Pneumococcal 0-64 years Vaccine  Aged Out       Assessment & Plan   Essential hypertension  Screening for lipid disorders   Diagnosis Orders   1. Physical exam        2. Essential hypertension  Basic Metabolic Panel      3. Screening for lipid disorders  Lipid Panel      4. Hypertension, unspecified type  metoprolol succinate (TOPROL XL) 25 MG extended release tablet       Resume metoprolol recheck bp in 1 week  Outpatient Encounter Medications as of 1/9/2023   Medication Sig Dispense Refill    metoprolol succinate (TOPROL XL) 25 MG extended release tablet Take 1 tablet by mouth daily 90 tablet 1    fluticasone (FLONASE) 50 MCG/ACT nasal spray 1 spray by Nasal route as needed for Rhinitis      MULTIPLE VITAMIN PO Take  by mouth daily. guaiFENesin (MUCINEX) 600 MG extended release tablet Take 1,200 mg by mouth daily as needed.       [DISCONTINUED] metoprolol succinate (TOPROL XL) 25 MG extended release tablet Take 1 tablet by mouth daily (Patient not taking: Reported on 1/9/2023) 90 tablet 1 [DISCONTINUED] MILK THISTLE PO Take by mouth       No facility-administered encounter medications on file as of 1/9/2023.      Orders Placed This Encounter   Procedures    Basic Metabolic Panel     Standing Status:   Future     Standing Expiration Date:   1/9/2024    Lipid Panel     Standing Status:   Future     Standing Expiration Date:   1/9/2024     Order Specific Question:   Is Patient Fasting?/# of Hours     Answer:   12      No follow-ups on file.         --Miles Bolus, DO

## 2023-01-11 DIAGNOSIS — Z13.220 SCREENING FOR LIPID DISORDERS: ICD-10-CM

## 2023-01-11 DIAGNOSIS — I10 ESSENTIAL HYPERTENSION: ICD-10-CM

## 2023-01-11 LAB
ANION GAP SERPL CALCULATED.3IONS-SCNC: 12 MMOL/L (ref 3–16)
BUN BLDV-MCNC: 21 MG/DL (ref 7–20)
CALCIUM SERPL-MCNC: 9.5 MG/DL (ref 8.3–10.6)
CHLORIDE BLD-SCNC: 104 MMOL/L (ref 99–110)
CHOLESTEROL, TOTAL: 211 MG/DL (ref 0–199)
CO2: 25 MMOL/L (ref 21–32)
CREAT SERPL-MCNC: 0.8 MG/DL (ref 0.6–1.2)
GFR SERPL CREATININE-BSD FRML MDRD: >60 ML/MIN/{1.73_M2}
GLUCOSE BLD-MCNC: 101 MG/DL (ref 70–99)
HDLC SERPL-MCNC: 73 MG/DL (ref 40–60)
LDL CHOLESTEROL CALCULATED: 124 MG/DL
POTASSIUM SERPL-SCNC: 4.5 MMOL/L (ref 3.5–5.1)
SODIUM BLD-SCNC: 141 MMOL/L (ref 136–145)
TRIGL SERPL-MCNC: 71 MG/DL (ref 0–150)
VLDLC SERPL CALC-MCNC: 14 MG/DL

## 2023-01-19 ENCOUNTER — NURSE ONLY (OUTPATIENT)
Dept: FAMILY MEDICINE CLINIC | Age: 63
End: 2023-01-19

## 2023-01-19 DIAGNOSIS — I10 ESSENTIAL HYPERTENSION: Primary | ICD-10-CM

## 2023-01-19 PROCEDURE — 2000F BLOOD PRESSURE MEASURE: CPT | Performed by: INTERNAL MEDICINE

## 2023-02-14 DIAGNOSIS — I10 HYPERTENSION, UNSPECIFIED TYPE: ICD-10-CM

## 2023-02-15 RX ORDER — METOPROLOL SUCCINATE 25 MG/1
25 TABLET, EXTENDED RELEASE ORAL DAILY
Qty: 90 TABLET | Refills: 1 | Status: SHIPPED | OUTPATIENT
Start: 2023-02-15

## 2023-07-24 DIAGNOSIS — I10 HYPERTENSION, UNSPECIFIED TYPE: ICD-10-CM

## 2023-07-24 RX ORDER — METOPROLOL SUCCINATE 25 MG/1
TABLET, EXTENDED RELEASE ORAL
Qty: 90 TABLET | Refills: 3 | Status: SHIPPED | OUTPATIENT
Start: 2023-07-24

## 2024-02-12 ENCOUNTER — OFFICE VISIT (OUTPATIENT)
Dept: FAMILY MEDICINE CLINIC | Age: 64
End: 2024-02-12
Payer: COMMERCIAL

## 2024-02-12 VITALS
BODY MASS INDEX: 27.42 KG/M2 | OXYGEN SATURATION: 97 % | DIASTOLIC BLOOD PRESSURE: 74 MMHG | HEART RATE: 79 BPM | WEIGHT: 164.6 LBS | HEIGHT: 65 IN | TEMPERATURE: 97.9 F | SYSTOLIC BLOOD PRESSURE: 118 MMHG

## 2024-02-12 DIAGNOSIS — Z13.1 DIABETES MELLITUS SCREENING: ICD-10-CM

## 2024-02-12 DIAGNOSIS — Z00.00 ANNUAL PHYSICAL EXAM: Primary | ICD-10-CM

## 2024-02-12 DIAGNOSIS — E78.00 ELEVATED LDL CHOLESTEROL LEVEL: ICD-10-CM

## 2024-02-12 DIAGNOSIS — I10 ESSENTIAL HYPERTENSION: ICD-10-CM

## 2024-02-12 PROCEDURE — G8482 FLU IMMUNIZE ORDER/ADMIN: HCPCS | Performed by: NURSE PRACTITIONER

## 2024-02-12 PROCEDURE — 99396 PREV VISIT EST AGE 40-64: CPT | Performed by: NURSE PRACTITIONER

## 2024-02-12 PROCEDURE — 3074F SYST BP LT 130 MM HG: CPT | Performed by: NURSE PRACTITIONER

## 2024-02-12 PROCEDURE — 3078F DIAST BP <80 MM HG: CPT | Performed by: NURSE PRACTITIONER

## 2024-02-12 NOTE — PROGRESS NOTES
Cat Isbell (:  1960) is a 63 y.o. female,New patient, here for evaluation of the following chief complaint(s):  New Patient (Np to get established from Dr Maxwell. Pt is not fasting )         ASSESSMENT/PLAN:  1. Annual physical exam  Encouraged pt to get eye exam, up to date on other health screens.     2. Diabetes mellitus screening  Due, check with annual labs.   - Hemoglobin A1C; Future    3. Essential hypertension  Well controlled. No change to medications today.   - Comprehensive Metabolic Panel; Future    4. Elevated LDL cholesterol level  Hx of, repeat lipid panel.   - Lipid, Fasting; Future       Return for yearly physical.         Subjective   SUBJECTIVE/OBJECTIVE:  HPI  Presents today to establish care/CPE. PMH significant for HTN.   Tearful today, son and family may be moving away and they are a big part of the pt life.   HTN-takes metoprolol 25 mg daily, randomly checks BP and  averages 110-120s/70-80s. Denies special diets. Cutting  out processed foods, increased fruits and vegetables. Off whole milk and back to 2%. No fast foods.   GYN- 2023 PAP and GYN, Dr. Pinedo.  Not up todate on vision exam, denies concerns, uses readers.   Up to date on dental.   Works full time at [employer].   Lives at home alone. Independent in [employer].   Current Outpatient Medications   Medication Sig Dispense Refill    metoprolol succinate (TOPROL XL) 25 MG extended release tablet TAKE 1 TABLET DAILY 90 tablet 3    fluticasone (FLONASE) 50 MCG/ACT nasal spray 1 spray by Nasal route as needed for Rhinitis      MULTIPLE VITAMIN PO Take  by mouth daily.       No current facility-administered medications for this visit.        Past Medical History:   Diagnosis Date    Allergic rhinitis     Hearing loss in      since birth, wears hearing aids    Hearing loss in right ear     wears hearing aids    Wears hearing aid     bilateral        Review of Systems   Constitutional:  Negative

## 2024-04-16 ENCOUNTER — TELEPHONE (OUTPATIENT)
Dept: FAMILY MEDICINE CLINIC | Age: 64
End: 2024-04-16

## 2024-05-20 DIAGNOSIS — E78.00 ELEVATED LDL CHOLESTEROL LEVEL: ICD-10-CM

## 2024-05-20 DIAGNOSIS — Z13.1 DIABETES MELLITUS SCREENING: ICD-10-CM

## 2024-05-20 DIAGNOSIS — I10 ESSENTIAL HYPERTENSION: ICD-10-CM

## 2024-05-21 ENCOUNTER — PATIENT MESSAGE (OUTPATIENT)
Dept: FAMILY MEDICINE CLINIC | Age: 64
End: 2024-05-21

## 2024-05-21 LAB
ALBUMIN SERPL-MCNC: 4.4 G/DL (ref 3.4–5)
ALBUMIN/GLOB SERPL: 1.5 {RATIO} (ref 1.1–2.2)
ALP SERPL-CCNC: 74 U/L (ref 40–129)
ALT SERPL-CCNC: 16 U/L (ref 10–40)
ANION GAP SERPL CALCULATED.3IONS-SCNC: 16 MMOL/L (ref 3–16)
AST SERPL-CCNC: 21 U/L (ref 15–37)
BILIRUB SERPL-MCNC: 0.4 MG/DL (ref 0–1)
BUN SERPL-MCNC: 14 MG/DL (ref 7–20)
CALCIUM SERPL-MCNC: 9.4 MG/DL (ref 8.3–10.6)
CHLORIDE SERPL-SCNC: 102 MMOL/L (ref 99–110)
CHOLEST SERPL-MCNC: 196 MG/DL (ref 0–199)
CO2 SERPL-SCNC: 22 MMOL/L (ref 21–32)
CREAT SERPL-MCNC: 0.7 MG/DL (ref 0.6–1.2)
EST. AVERAGE GLUCOSE BLD GHB EST-MCNC: 108.3 MG/DL
GFR SERPLBLD CREATININE-BSD FMLA CKD-EPI: >90 ML/MIN/{1.73_M2}
GLUCOSE SERPL-MCNC: 90 MG/DL (ref 70–99)
HBA1C MFR BLD: 5.4 %
HDLC SERPL-MCNC: 86 MG/DL (ref 40–60)
LDL CHOLESTEROL: 95 MG/DL
POTASSIUM SERPL-SCNC: 4.5 MMOL/L (ref 3.5–5.1)
PROT SERPL-MCNC: 7.4 G/DL (ref 6.4–8.2)
SODIUM SERPL-SCNC: 140 MMOL/L (ref 136–145)
TRIGL SERPL-MCNC: 74 MG/DL (ref 0–150)
VLDLC SERPL CALC-MCNC: 15 MG/DL

## 2024-07-24 DIAGNOSIS — I10 HYPERTENSION, UNSPECIFIED TYPE: ICD-10-CM

## 2024-07-24 RX ORDER — METOPROLOL SUCCINATE 25 MG/1
25 TABLET, EXTENDED RELEASE ORAL DAILY
Qty: 90 TABLET | Refills: 3 | Status: SHIPPED | OUTPATIENT
Start: 2024-07-24

## 2025-01-22 ENCOUNTER — TELEPHONE (OUTPATIENT)
Dept: FAMILY MEDICINE CLINIC | Age: 65
End: 2025-01-22

## 2025-01-22 NOTE — TELEPHONE ENCOUNTER
Patient due for mammogram and yearly physical with Cat L around 2-. LM for patient to call the office to schedule for Cat and mammogram.

## 2025-02-08 ASSESSMENT — PATIENT HEALTH QUESTIONNAIRE - PHQ9
SUM OF ALL RESPONSES TO PHQ QUESTIONS 1-9: 2
2. FEELING DOWN, DEPRESSED OR HOPELESS: SEVERAL DAYS
1. LITTLE INTEREST OR PLEASURE IN DOING THINGS: SEVERAL DAYS
2. FEELING DOWN, DEPRESSED OR HOPELESS: SEVERAL DAYS
SUM OF ALL RESPONSES TO PHQ9 QUESTIONS 1 & 2: 2
SUM OF ALL RESPONSES TO PHQ QUESTIONS 1-9: 2
SUM OF ALL RESPONSES TO PHQ9 QUESTIONS 1 & 2: 2
SUM OF ALL RESPONSES TO PHQ QUESTIONS 1-9: 2
SUM OF ALL RESPONSES TO PHQ QUESTIONS 1-9: 2
1. LITTLE INTEREST OR PLEASURE IN DOING THINGS: SEVERAL DAYS

## 2025-02-12 ENCOUNTER — OFFICE VISIT (OUTPATIENT)
Dept: FAMILY MEDICINE CLINIC | Age: 65
End: 2025-02-12
Payer: COMMERCIAL

## 2025-02-12 VITALS
DIASTOLIC BLOOD PRESSURE: 92 MMHG | OXYGEN SATURATION: 98 % | SYSTOLIC BLOOD PRESSURE: 158 MMHG | HEART RATE: 87 BPM | TEMPERATURE: 98.2 F | BODY MASS INDEX: 27.42 KG/M2 | WEIGHT: 164.6 LBS | HEIGHT: 65 IN

## 2025-02-12 DIAGNOSIS — Z00.00 ANNUAL PHYSICAL EXAM: Primary | ICD-10-CM

## 2025-02-12 DIAGNOSIS — Z13.1 DIABETES MELLITUS SCREENING: ICD-10-CM

## 2025-02-12 DIAGNOSIS — I10 ESSENTIAL HYPERTENSION: ICD-10-CM

## 2025-02-12 DIAGNOSIS — M19.049 HAND ARTHRITIS: ICD-10-CM

## 2025-02-12 DIAGNOSIS — E78.00 ELEVATED LDL CHOLESTEROL LEVEL: ICD-10-CM

## 2025-02-12 PROCEDURE — 3080F DIAST BP >= 90 MM HG: CPT | Performed by: NURSE PRACTITIONER

## 2025-02-12 PROCEDURE — 3077F SYST BP >= 140 MM HG: CPT | Performed by: NURSE PRACTITIONER

## 2025-02-12 PROCEDURE — 99396 PREV VISIT EST AGE 40-64: CPT | Performed by: NURSE PRACTITIONER

## 2025-02-12 RX ORDER — LOSARTAN POTASSIUM 25 MG/1
25 TABLET ORAL DAILY
Qty: 90 TABLET | Refills: 1 | Status: SHIPPED | OUTPATIENT
Start: 2025-02-12

## 2025-02-12 SDOH — ECONOMIC STABILITY: FOOD INSECURITY: WITHIN THE PAST 12 MONTHS, YOU WORRIED THAT YOUR FOOD WOULD RUN OUT BEFORE YOU GOT MONEY TO BUY MORE.: NEVER TRUE

## 2025-02-12 SDOH — ECONOMIC STABILITY: FOOD INSECURITY: WITHIN THE PAST 12 MONTHS, THE FOOD YOU BOUGHT JUST DIDN'T LAST AND YOU DIDN'T HAVE MONEY TO GET MORE.: NEVER TRUE

## 2025-02-12 ASSESSMENT — ENCOUNTER SYMPTOMS
SHORTNESS OF BREATH: 0
COUGH: 0
ABDOMINAL PAIN: 0

## 2025-02-12 NOTE — PROGRESS NOTES
Cat Isbell (:  1960) is a 64 y.o. female,Established patient, here for evaluation of the following chief complaint(s):  Annual Exam (Annual exam )      Assessment & Plan   ASSESSMENT/PLAN:  1. Annual physical exam  Discussed shingles immunization, has breast ca and cervical ca screens scheduled.     2. Essential hypertension  Not controlled. Add losartan, increase exercise, work on weight loss.   - Comprehensive Metabolic Panel; Future  - losartan (COZAAR) 25 MG tablet; Take 1 tablet by mouth daily  Dispense: 90 tablet; Refill: 1    3. Diabetes mellitus screening  Stable Due for labs  - Hemoglobin A1C; Future    4. Elevated LDL cholesterol level  Stable Due for labs  - Lipid, Fasting; Future    5. Hand arthritis  Not controlled, trial gel  - diclofenac sodium (VOLTAREN) 1 % GEL; Apply 2 g topically 4 times daily  Dispense: 100 g; Refill: 1       Return for yearly physical.         Subjective   SUBJECTIVE/OBJECTIVE:  HPI  Presents today to for annual physical. PMH significant for HTN.      HTN-takes metoprolol 25 mg daily, randomly checks BP and  averages 140-150/80-90s. Denies special diets. Cutting  out processed foods, increased fruits and vegetables. Off whole milk and back to 2%. No fast foods. Trying to get 8000 steps a day    GYN- GYN, Dr. Courtney, [facility], Mammogram scheduled 2025 and yearly PAP  Not up todate on vision exam, denies concerns, uses readers.   Not up todate on dental.     ENT-needs new hearing aids, left ear pain, states she is watching a lot more TV due to decrease in work and it may be related to wearing her TV headphones.     Increased in life stressors over the last 8 wks, full time hrs decreased to 12-8 hrs weekly at [employer]. Drinking beer daily due to not working, going to start a in home program to cut back on drinking and a 4 wk program to \"get lean\" has a friend that is supporting her with the program.     Lives at home alone. Independent in [employer].     Mammogram

## 2025-05-07 DIAGNOSIS — I10 ESSENTIAL HYPERTENSION: ICD-10-CM

## 2025-05-07 DIAGNOSIS — Z13.1 DIABETES MELLITUS SCREENING: ICD-10-CM

## 2025-05-07 DIAGNOSIS — E78.00 ELEVATED LDL CHOLESTEROL LEVEL: ICD-10-CM

## 2025-05-07 LAB
ALBUMIN SERPL-MCNC: 4.4 G/DL (ref 3.4–5)
ALBUMIN/GLOB SERPL: 1.7 {RATIO} (ref 1.1–2.2)
ALP SERPL-CCNC: 62 U/L (ref 40–129)
ALT SERPL-CCNC: 22 U/L (ref 10–40)
ANION GAP SERPL CALCULATED.3IONS-SCNC: 12 MMOL/L (ref 3–16)
AST SERPL-CCNC: 25 U/L (ref 15–37)
BILIRUB SERPL-MCNC: 0.5 MG/DL (ref 0–1)
BUN SERPL-MCNC: 17 MG/DL (ref 7–20)
CALCIUM SERPL-MCNC: 9.3 MG/DL (ref 8.3–10.6)
CHLORIDE SERPL-SCNC: 106 MMOL/L (ref 99–110)
CHOLEST SERPL-MCNC: 230 MG/DL (ref 0–199)
CO2 SERPL-SCNC: 24 MMOL/L (ref 21–32)
CREAT SERPL-MCNC: 0.7 MG/DL (ref 0.6–1.2)
EST. AVERAGE GLUCOSE BLD GHB EST-MCNC: 105.4 MG/DL
GFR SERPLBLD CREATININE-BSD FMLA CKD-EPI: >90 ML/MIN/{1.73_M2}
GLUCOSE SERPL-MCNC: 95 MG/DL (ref 70–99)
HBA1C MFR BLD: 5.3 %
HDLC SERPL-MCNC: 82 MG/DL (ref 40–60)
LDL CHOLESTEROL: 128 MG/DL
POTASSIUM SERPL-SCNC: 4.5 MMOL/L (ref 3.5–5.1)
PROT SERPL-MCNC: 7 G/DL (ref 6.4–8.2)
SODIUM SERPL-SCNC: 142 MMOL/L (ref 136–145)
TRIGL SERPL-MCNC: 98 MG/DL (ref 0–150)
VLDLC SERPL CALC-MCNC: 20 MG/DL

## 2025-05-08 ENCOUNTER — RESULTS FOLLOW-UP (OUTPATIENT)
Dept: FAMILY MEDICINE CLINIC | Age: 65
End: 2025-05-08

## 2025-05-19 ENCOUNTER — TELEPHONE (OUTPATIENT)
Dept: FAMILY MEDICINE CLINIC | Age: 65
End: 2025-05-19

## 2025-05-19 NOTE — TELEPHONE ENCOUNTER
Tried to call pt on physical form we need a waist measurement and a signature. Form is in green folder on my desk

## 2025-06-27 DIAGNOSIS — I10 ESSENTIAL HYPERTENSION: ICD-10-CM

## 2025-06-27 RX ORDER — LOSARTAN POTASSIUM 25 MG/1
25 TABLET ORAL DAILY
Qty: 90 TABLET | Refills: 1 | Status: SHIPPED | OUTPATIENT
Start: 2025-06-27

## 2025-07-14 DIAGNOSIS — I10 HYPERTENSION, UNSPECIFIED TYPE: ICD-10-CM

## 2025-07-14 RX ORDER — METOPROLOL SUCCINATE 25 MG/1
25 TABLET, EXTENDED RELEASE ORAL DAILY
Qty: 90 TABLET | Refills: 1 | Status: SHIPPED | OUTPATIENT
Start: 2025-07-14